# Patient Record
Sex: FEMALE | URBAN - METROPOLITAN AREA
[De-identification: names, ages, dates, MRNs, and addresses within clinical notes are randomized per-mention and may not be internally consistent; named-entity substitution may affect disease eponyms.]

---

## 2019-04-02 ENCOUNTER — APPOINTMENT (RX ONLY)
Dept: URBAN - METROPOLITAN AREA CLINIC 302 | Facility: CLINIC | Age: 39
Setting detail: DERMATOLOGY
End: 2019-04-02

## 2019-04-02 DIAGNOSIS — L98.8 OTHER SPECIFIED DISORDERS OF THE SKIN AND SUBCUTANEOUS TISSUE: ICD-10-CM

## 2019-04-02 DIAGNOSIS — L81.4 OTHER MELANIN HYPERPIGMENTATION: ICD-10-CM

## 2019-04-02 DIAGNOSIS — I78.8 OTHER DISEASES OF CAPILLARIES: ICD-10-CM

## 2019-04-02 PROCEDURE — ? COUNSELING

## 2019-04-02 PROCEDURE — ? PRESCRIPTION

## 2019-04-02 PROCEDURE — 99213 OFFICE O/P EST LOW 20 MIN: CPT

## 2019-04-02 PROCEDURE — ? PULSED-DYE LASER

## 2019-04-02 RX ORDER — TRETINOIN 0.25 MG/G
CREAM TOPICAL
Qty: 1 | Refills: 2 | Status: ERX | COMMUNITY
Start: 2019-04-02

## 2019-04-02 RX ADMIN — TRETINOIN: 0.25 CREAM TOPICAL at 15:04

## 2019-04-02 ASSESSMENT — LOCATION DETAILED DESCRIPTION DERM
LOCATION DETAILED: LEFT NARIS
LOCATION DETAILED: RIGHT MEDIAL MALAR CHEEK
LOCATION DETAILED: RIGHT CENTRAL MALAR CHEEK
LOCATION DETAILED: RIGHT NARIS
LOCATION DETAILED: LEFT CENTRAL MALAR CHEEK

## 2019-04-02 ASSESSMENT — LOCATION ZONE DERM
LOCATION ZONE: FACE
LOCATION ZONE: NOSE

## 2019-04-02 ASSESSMENT — LOCATION SIMPLE DESCRIPTION DERM
LOCATION SIMPLE: RIGHT NOSE
LOCATION SIMPLE: LEFT NOSE
LOCATION SIMPLE: LEFT CHEEK
LOCATION SIMPLE: RIGHT CHEEK

## 2019-04-02 NOTE — PROCEDURE: COUNSELING
Detail Level: Simple
Patient Specific Counseling (Will Not Stick From Patient To Patient): Patient referred to [facility] for IPL laser treatment.
Detail Level: Generalized

## 2019-04-02 NOTE — HPI: RASH (ROSACEA)
How Severe Is Your Rosacea?: moderate
Is This A New Presentation, Or A Follow-Up?: Follow Up Rosacea
Additional History: Patient would also like to discuss VBeam laser treatment to her nose. Patient had it last done in April of 2018 with good results. Patient was not sure how often she could have the laser treatment done. Patient also wanted to discuss laser treatment options for the brown spots on her face.

## 2019-04-02 NOTE — PROCEDURE: PULSED-DYE LASER
Spot Size: 7 mm
Treated Area: small area
Cryogen Time (Ms): 30
Laser Type: Vbeam Perfecta 595nm
Pulse Duration: 10 ms
Spot Size: 10x3 mm
Post-Care Instructions: I reviewed with the patient in detail post-care instructions. Patient should stay away from the sun and wear sun protection until treated areas are fully healed.
Delay Time (Ms): 20
Location (Required For Details To Render In Note But Body Touch Will Also Count For First Location): nose
Fluence In J/Cm2 (Optional): 8
Post-Procedure Care: Post care reviewed with patient.
Pulse Count (Optional): 10
Fluence In J/Cm2 (Optional): 12
Price (Use Numbers Only, No Special Characters Or $): 50
Cryogen Time (Ms): 30
Detail Level: Zone
Immediate Endpoint: erythema
Consent: Written consent obtained, risks reviewed including but not limited to crusting, scabbing, blistering, scarring, darker or lighter pigmentary change, incidental hair removal, bruising, and need for additional treatments.

## 2021-01-19 ENCOUNTER — TELEPHONE (OUTPATIENT)
Dept: SCHEDULING | Facility: IMAGING CENTER | Age: 41
End: 2021-01-19

## 2021-01-19 ENCOUNTER — NURSE TRIAGE (OUTPATIENT)
Dept: HEALTH INFORMATION MANAGEMENT | Facility: OTHER | Age: 41
End: 2021-01-19

## 2021-01-19 NOTE — TELEPHONE ENCOUNTER
Regarding: office clearance or next course of action  ----- Message from Deanne Adame sent at 1/19/2021  1:21 PM PST -----  Patient would like to get established with Cathy Arevalo but is experiencing upper abdominal pain. Request clearance for visit with provider.

## 2021-01-19 NOTE — TELEPHONE ENCOUNTER
"RUQ pain for 2 weeks.  The pain has not gotten better or worse.   Denies fever or chest pain  States will feel short of breath when she takes a deep breath.    No significant past medical history.  Stop drinking alcohol since the pain started.  Drank 2-3 glasses of wine 5-6 times a week.  Advise to see PCP.    Reason for Disposition  • Constant abdominal pain lasting > 2 hours    Additional Information  • Negative: Passed out (i.e., fainted, collapsed and was not responding)  • Negative: Shock suspected (e.g., cold/pale/clammy skin, too weak to stand, low BP, rapid pulse)  • Negative: Visible sweat on face or sweat is dripping down  • Negative: Chest pain  • Negative: SEVERE abdominal pain (e.g., excruciating)  • Negative: Pain lasting > 10 minutes and over 50 years old  • Negative: Pain lasting > 10 minutes and over 40 years old and associated chest, arm, neck, upper back, or jaw pain  • Negative: Pain lasting > 10 minutes and over 35 years old and at least one cardiac risk factor  • Negative: Pain lasting > 10 minutes and history of heart disease (i.e., heart attack, bypass surgery, angina, angioplasty, CHF)  • Negative: Recent injury to the abdomen  • Negative: Vomiting red blood or black (coffee ground) material  • Negative: Bloody, black, or tarry bowel movements  • Negative: Pregnant > 24 weeks and hand or face swelling    Answer Assessment - Initial Assessment Questions  1. LOCATION: \"Where does it hurt?\"       RUQ  2. RADIATION: \"Does the pain shoot anywhere else?\" (e.g., chest, back)      No  3. ONSET: \"When did the pain begin?\" (e.g., minutes, hours or days ago)       2 week  4. SUDDEN: \"Gradual or sudden onset?\"      Gradual  5. PATTERN \"Does the pain come and go, or is it constant?\"     - If constant: \"Is it getting better, staying the same, or worsening?\"       (Note: Constant means the pain never goes away completely; most serious pain is constant and it progresses)      - If intermittent: \"How long " "does it last?\" \"Do you have pain now?\"      (Note: Intermittent means the pain goes away completely between bouts)      Constant  6. SEVERITY: \"How bad is the pain?\"  (e.g., Scale 1-10; mild, moderate, or severe)     - MILD (1-3): doesn't interfere with normal activities, abdomen soft and not tender to touch      - MODERATE (4-7): interferes with normal activities or awakens from sleep, tender to touch      - SEVERE (8-10): excruciating pain, doubled over, unable to do any normal activities        2/10  7. RECURRENT SYMPTOM: \"Have you ever had this type of abdominal pain before?\" If so, ask: \"When was the last time?\" and \"What happened that time?\"       No  8. AGGRAVATING FACTORS: \"Does anything seem to cause this pain?\" (e.g., foods, stress, alcohol)      No  9. CARDIAC SYMPTOMS: \"Do you have any of the following symptoms: chest pain, difficulty breathing, sweating, nausea?\"      Difficulty breathing when she takes a deep breath  10. OTHER SYMPTOMS: \"Do you have any other symptoms?\" (e.g., fever, vomiting, diarrhea)        No  11. PREGNANCY: \"Is there any chance you are pregnant?\" \"When was your last menstrual period?\"        No    Protocols used: ABDOMINAL PAIN - UPPER-A-OH    "

## 2021-02-09 ENCOUNTER — OFFICE VISIT (OUTPATIENT)
Dept: URGENT CARE | Facility: CLINIC | Age: 41
End: 2021-02-09
Payer: COMMERCIAL

## 2021-02-09 VITALS
WEIGHT: 152.4 LBS | BODY MASS INDEX: 22.57 KG/M2 | HEIGHT: 69 IN | TEMPERATURE: 98 F | OXYGEN SATURATION: 98 % | SYSTOLIC BLOOD PRESSURE: 102 MMHG | DIASTOLIC BLOOD PRESSURE: 70 MMHG | RESPIRATION RATE: 16 BRPM | HEART RATE: 77 BPM

## 2021-02-09 DIAGNOSIS — R10.11 RUQ PAIN: ICD-10-CM

## 2021-02-09 LAB
APPEARANCE UR: CLEAR
BILIRUB UR STRIP-MCNC: NEGATIVE MG/DL
COLOR UR AUTO: YELLOW
GLUCOSE UR STRIP.AUTO-MCNC: NEGATIVE MG/DL
INT CON NEG: NEGATIVE
INT CON POS: POSITIVE
KETONES UR STRIP.AUTO-MCNC: 40 MG/DL
LEUKOCYTE ESTERASE UR QL STRIP.AUTO: NEGATIVE
NITRITE UR QL STRIP.AUTO: NEGATIVE
PH UR STRIP.AUTO: 6 [PH] (ref 5–8)
POC URINE PREGNANCY TEST: NEGATIVE
PROT UR QL STRIP: NEGATIVE MG/DL
RBC UR QL AUTO: NEGATIVE
SP GR UR STRIP.AUTO: >=1.03
UROBILINOGEN UR STRIP-MCNC: 0.2 MG/DL

## 2021-02-09 PROCEDURE — 81025 URINE PREGNANCY TEST: CPT | Performed by: PHYSICIAN ASSISTANT

## 2021-02-09 PROCEDURE — 81002 URINALYSIS NONAUTO W/O SCOPE: CPT | Performed by: PHYSICIAN ASSISTANT

## 2021-02-09 PROCEDURE — 99203 OFFICE O/P NEW LOW 30 MIN: CPT | Performed by: PHYSICIAN ASSISTANT

## 2021-02-09 RX ORDER — OMEPRAZOLE 20 MG/1
20 CAPSULE, DELAYED RELEASE ORAL DAILY
Qty: 30 CAP | Refills: 0 | Status: SHIPPED | OUTPATIENT
Start: 2021-02-09 | End: 2021-03-22

## 2021-02-09 ASSESSMENT — ENCOUNTER SYMPTOMS
DIARRHEA: 0
MYALGIAS: 0
ABDOMINAL PAIN: 1
BLOOD IN STOOL: 0
HEADACHES: 0
WEIGHT LOSS: 0
WEAKNESS: 0
DIZZINESS: 0
COUGH: 0
SHORTNESS OF BREATH: 0
HEARTBURN: 0
CONSTIPATION: 0
VOMITING: 0
PALPITATIONS: 0
FLANK PAIN: 0
NAUSEA: 0
DIAPHORESIS: 0
FEVER: 0
CHILLS: 0

## 2021-02-10 ENCOUNTER — HOSPITAL ENCOUNTER (OUTPATIENT)
Dept: LAB | Facility: MEDICAL CENTER | Age: 41
End: 2021-02-10
Attending: PHYSICIAN ASSISTANT
Payer: COMMERCIAL

## 2021-02-10 ENCOUNTER — TELEPHONE (OUTPATIENT)
Dept: OBGYN | Facility: CLINIC | Age: 41
End: 2021-02-10

## 2021-02-10 DIAGNOSIS — R10.11 RUQ PAIN: ICD-10-CM

## 2021-02-10 LAB
ALBUMIN SERPL BCP-MCNC: 4.2 G/DL (ref 3.2–4.9)
ALBUMIN/GLOB SERPL: 1.5 G/DL
ALP SERPL-CCNC: 44 U/L (ref 30–99)
ALT SERPL-CCNC: <5 U/L (ref 2–50)
ANION GAP SERPL CALC-SCNC: 9 MMOL/L (ref 7–16)
AST SERPL-CCNC: 19 U/L (ref 12–45)
BASOPHILS # BLD AUTO: 1.2 % (ref 0–1.8)
BASOPHILS # BLD: 0.05 K/UL (ref 0–0.12)
BILIRUB CONJ SERPL-MCNC: <0.2 MG/DL (ref 0.1–0.5)
BILIRUB INDIRECT SERPL-MCNC: NORMAL MG/DL (ref 0–1)
BILIRUB SERPL-MCNC: 0.4 MG/DL (ref 0.1–1.5)
BUN SERPL-MCNC: 7 MG/DL (ref 8–22)
CALCIUM SERPL-MCNC: 9 MG/DL (ref 8.5–10.5)
CHLORIDE SERPL-SCNC: 104 MMOL/L (ref 96–112)
CO2 SERPL-SCNC: 24 MMOL/L (ref 20–33)
CREAT SERPL-MCNC: 0.56 MG/DL (ref 0.5–1.4)
EOSINOPHIL # BLD AUTO: 0.07 K/UL (ref 0–0.51)
EOSINOPHIL NFR BLD: 1.7 % (ref 0–6.9)
ERYTHROCYTE [DISTWIDTH] IN BLOOD BY AUTOMATED COUNT: 39 FL (ref 35.9–50)
GLOBULIN SER CALC-MCNC: 2.8 G/DL (ref 1.9–3.5)
GLUCOSE SERPL-MCNC: 83 MG/DL (ref 65–99)
HCT VFR BLD AUTO: 41.2 % (ref 37–47)
HGB BLD-MCNC: 14 G/DL (ref 12–16)
IMM GRANULOCYTES # BLD AUTO: 0.02 K/UL (ref 0–0.11)
IMM GRANULOCYTES NFR BLD AUTO: 0.5 % (ref 0–0.9)
LYMPHOCYTES # BLD AUTO: 1.71 K/UL (ref 1–4.8)
LYMPHOCYTES NFR BLD: 40.6 % (ref 22–41)
MCH RBC QN AUTO: 30.5 PG (ref 27–33)
MCHC RBC AUTO-ENTMCNC: 34 G/DL (ref 33.6–35)
MCV RBC AUTO: 89.8 FL (ref 81.4–97.8)
MONOCYTES # BLD AUTO: 0.47 K/UL (ref 0–0.85)
MONOCYTES NFR BLD AUTO: 11.2 % (ref 0–13.4)
NEUTROPHILS # BLD AUTO: 1.89 K/UL (ref 2–7.15)
NEUTROPHILS NFR BLD: 44.8 % (ref 44–72)
NRBC # BLD AUTO: 0 K/UL
NRBC BLD-RTO: 0 /100 WBC
PLATELET # BLD AUTO: 266 K/UL (ref 164–446)
PMV BLD AUTO: 10.1 FL (ref 9–12.9)
POTASSIUM SERPL-SCNC: 4 MMOL/L (ref 3.6–5.5)
PROT SERPL-MCNC: 7 G/DL (ref 6–8.2)
RBC # BLD AUTO: 4.59 M/UL (ref 4.2–5.4)
SODIUM SERPL-SCNC: 137 MMOL/L (ref 135–145)
WBC # BLD AUTO: 4.2 K/UL (ref 4.8–10.8)

## 2021-02-10 PROCEDURE — 82248 BILIRUBIN DIRECT: CPT

## 2021-02-10 PROCEDURE — 85025 COMPLETE CBC W/AUTO DIFF WBC: CPT

## 2021-02-10 PROCEDURE — 36415 COLL VENOUS BLD VENIPUNCTURE: CPT

## 2021-02-10 PROCEDURE — 80053 COMPREHEN METABOLIC PANEL: CPT

## 2021-02-10 NOTE — PROGRESS NOTES
Subjective:   Hailey Machuca is a 41 y.o. female who presents for Abdominal Pain (x1 month)      HPI:  This is a very pleasant 41-year-old female presenting to the clinic with right upper quadrant abdominal pain x1 month.  Patient states the pain is a dull achy and bloated sensation beneath her right rib cage.  The pain has been constant for the last 1 month.  There seems to be no aggravating or alleviating factors.  Pain is not increased after eating.  She denies any associated nausea, vomiting or diarrhea.  Denies any fevers, chills or myalgias.  She denies any melena or hematemesis.  She has not tried any OTC medications for her symptoms.  Denies any previous history of cholelithiasis, GERD or PUD.  Prior to the onset of her abdominal pain she did drink approximately 3 to 4 glasses of wine 5 nights per week.  Since her abdominal pain she quit drinking.  She denies any drug use.  Denies any new medications.  Denies any history of liver disease.  Denies any significant family history.    Review of Systems   Constitutional: Negative for chills, diaphoresis, fever, malaise/fatigue and weight loss.   HENT: Negative for congestion.    Respiratory: Negative for cough and shortness of breath.    Cardiovascular: Negative for chest pain and palpitations.   Gastrointestinal: Positive for abdominal pain. Negative for blood in stool, constipation, diarrhea, heartburn, melena, nausea and vomiting.   Genitourinary: Negative for dysuria, flank pain, frequency, hematuria and urgency.   Musculoskeletal: Negative for myalgias.   Skin: Negative for rash.   Neurological: Negative for dizziness, weakness and headaches.       Medications:    • omeprazole    Allergies: Penicillins and Sulfa drugs    Problem List: Hailey Machuca does not have a problem list on file.    Surgical History:  No past surgical history on file.    Past Social Hx: Hailey Machuca  reports that she has never smoked. She has never used smokeless tobacco. She reports  "current alcohol use. She reports that she does not use drugs.     Past Family Hx:  Hailey Machuca family history is not on file.     Problem list, medications, and allergies reviewed by myself today in Epic.     Objective:     /70 (BP Location: Left arm, Patient Position: Sitting)   Pulse 77   Temp 36.7 °C (98 °F) (Tympanic)   Resp 16   Ht 1.753 m (5' 9\")   Wt 69.1 kg (152 lb 6.4 oz)   SpO2 98%   BMI 22.51 kg/m²     Physical Exam  Constitutional:       General: She is not in acute distress.     Appearance: Normal appearance. She is not ill-appearing, toxic-appearing or diaphoretic.   HENT:      Head: Normocephalic and atraumatic.      Mouth/Throat:      Mouth: Mucous membranes are moist.      Pharynx: No oropharyngeal exudate or posterior oropharyngeal erythema.   Eyes:      Conjunctiva/sclera: Conjunctivae normal.   Neck:      Musculoskeletal: Normal range of motion. No neck rigidity or muscular tenderness.   Cardiovascular:      Rate and Rhythm: Normal rate and regular rhythm.      Pulses: Normal pulses.      Heart sounds: Normal heart sounds.   Pulmonary:      Effort: Pulmonary effort is normal.      Breath sounds: Normal breath sounds. No wheezing, rhonchi or rales.   Abdominal:      General: Bowel sounds are normal. There is no distension.      Palpations: Abdomen is soft. There is no mass.      Tenderness: There is abdominal tenderness. There is no right CVA tenderness, left CVA tenderness, guarding or rebound.      Hernia: No hernia is present.      Comments: Mild right upper quadrant tenderness to deep palpation.  Negative Gamble sign.  Normal bowel sounds in all quadrant.  No guarding or rebound tenderness.  Negative McBurney's point.   Musculoskeletal: Normal range of motion.   Lymphadenopathy:      Cervical: No cervical adenopathy.   Skin:     General: Skin is warm and dry.      Capillary Refill: Capillary refill takes less than 2 seconds.   Neurological:      General: No focal deficit " present.      Mental Status: She is alert and oriented to person, place, and time. Mental status is at baseline.   Psychiatric:         Mood and Affect: Mood normal.         Thought Content: Thought content normal.       Urine analysis: 40 mg/dL ketones.  All others within normal limits.  Urine hCG: Negative    Assessment/Plan:     Diagnosis and associated orders:     1. RUQ pain  Comp Metabolic Panel    CBC WITH DIFFERENTIAL    HEPATIC FUNCTION PANEL    omeprazole (PRILOSEC) 20 MG delayed-release capsule    POCT Urinalysis    POCT Pregnancy      Comments/MDM:       • Differential diagnoses and treatment options were discussed with the patient at length today.  Current differentials include cholelithiasis versus hepatitis versus PUD versus pancreatitis versus gastritis.  On physical exam she was minimally tender to palpation.  Vital signs stable and within normal limits.  At this time she would rather have basic blood work performed.  Right upper quadrant imaging was discussed however she respectfully declined this at this time.  Vitals like the patient to start on omeprazole 20 mg daily for the next 14 days.  I will follow-up once her lab results are available.  Encouraged to call with any questions or concerns.  Strict ER precautions were given for any red flag symptoms in which we discussed in clinic.           Differential diagnosis, natural history, supportive care, and indications for immediate follow-up discussed.    Advised the patient to follow-up with the primary care physician for recheck, reevaluation, and consideration of further management.    Please note that this dictation was created using voice recognition software. I have made reasonable attempt to correct obvious errors, but I expect that there are errors of grammar and possibly content that I did not discover before finalizing the note.    This note was electronically signed by MOJGAN Paulino PA-C

## 2021-02-21 ENCOUNTER — OFFICE VISIT (OUTPATIENT)
Dept: URGENT CARE | Facility: CLINIC | Age: 41
End: 2021-02-21
Payer: COMMERCIAL

## 2021-02-21 VITALS
SYSTOLIC BLOOD PRESSURE: 106 MMHG | WEIGHT: 152 LBS | OXYGEN SATURATION: 97 % | DIASTOLIC BLOOD PRESSURE: 66 MMHG | BODY MASS INDEX: 22.51 KG/M2 | HEIGHT: 69 IN | TEMPERATURE: 99 F | RESPIRATION RATE: 16 BRPM | HEART RATE: 64 BPM

## 2021-02-21 DIAGNOSIS — R10.11 RUQ PAIN: ICD-10-CM

## 2021-02-21 PROCEDURE — 99213 OFFICE O/P EST LOW 20 MIN: CPT | Performed by: FAMILY MEDICINE

## 2021-02-21 RX ORDER — IBUPROFEN 200 MG
200 TABLET ORAL EVERY 6 HOURS PRN
COMMUNITY
End: 2022-03-24

## 2021-02-21 ASSESSMENT — ENCOUNTER SYMPTOMS
FEVER: 0
VOMITING: 0
COUGH: 0
SORE THROAT: 0
DIARRHEA: 0
ABDOMINAL PAIN: 1

## 2021-02-21 ASSESSMENT — FIBROSIS 4 INDEX: FIB4 SCORE: 1.313198307917873974

## 2021-02-21 NOTE — PROGRESS NOTES
"Subjective:     Hailey Machuca is a 41 y.o. female who presents for Abdominal Pain (last visit: 02/09/2021, close to 2 months,RUQ pain increasing and radiating to her back)    HPI  Pt presents for evaluation of abd pain   Pt with RUQ chronically and worse the past 2 months   Was seen in office 2 weeks ago   At the time, obtained labs and empirically treated with omeprazole  Does not feel like the omeprazole is helping  Feels like a cramp in the stomach   Pain is constantly present and stable  Bothers her a little bit more with eating  Patient a vegetarian  No family members have needed cholecystectomy as far she knows    Review of Systems   Constitutional: Negative for fever.   HENT: Negative for sore throat.    Respiratory: Negative for cough.    Gastrointestinal: Positive for abdominal pain. Negative for diarrhea and vomiting.   Skin: Negative for rash.     PMH:  has no past medical history on file.  MEDS:   Current Outpatient Medications:   •  ibuprofen (MOTRIN) 200 MG Tab, Take 200 mg by mouth every 6 hours as needed., Disp: , Rfl:   •  omeprazole (PRILOSEC) 20 MG delayed-release capsule, Take 1 Cap by mouth every day., Disp: 30 Cap, Rfl: 0  ALLERGIES:   Allergies   Allergen Reactions   • Penicillins Anaphylaxis   • Sulfa Drugs Anaphylaxis     SURGHX: History reviewed. No pertinent surgical history.  SOCHX:  reports that she has never smoked. She has never used smokeless tobacco. She reports previous alcohol use. She reports that she does not use drugs.     Objective:   /66 (BP Location: Right arm, Patient Position: Sitting, BP Cuff Size: Adult)   Pulse 64   Temp 37.2 °C (99 °F) (Temporal)   Resp 16   Ht 1.753 m (5' 9\")   Wt 68.9 kg (152 lb)   SpO2 97%   BMI 22.45 kg/m²     Physical Exam  Constitutional:       General: She is not in acute distress.     Appearance: She is well-developed. She is not diaphoretic.   HENT:      Head: Normocephalic and atraumatic.   Pulmonary:      Effort: Pulmonary " effort is normal.   Abdominal:      General: Abdomen is flat. Bowel sounds are normal. There is no distension.      Palpations: Abdomen is soft.      Tenderness: There is no guarding or rebound.      Comments: Neg Gamble's, +TTP in RUQ    Skin:     General: Skin is warm and dry.      Findings: No erythema.   Neurological:      Mental Status: She is alert and oriented to person, place, and time.   Psychiatric:         Behavior: Behavior normal.         Thought Content: Thought content normal.         Judgment: Judgment normal.         Assessment/Plan:   Assessment    1. RUQ pain  - US-RUQ; Future    Patient with right upper quadrant pain.  Not improving with PPI therapy.  Most concerned for cholelithiasis.  Will obtain right upper quadrant ultrasound to further evaluate.  Patient advised to follow a low-fat diet until then.  We will follow-up test results and consider referrals to general surgery versus GI depending on the results

## 2021-02-22 ENCOUNTER — HOSPITAL ENCOUNTER (OUTPATIENT)
Dept: RADIOLOGY | Facility: MEDICAL CENTER | Age: 41
End: 2021-02-22
Attending: FAMILY MEDICINE
Payer: COMMERCIAL

## 2021-02-22 DIAGNOSIS — R10.11 RUQ PAIN: ICD-10-CM

## 2021-02-22 PROCEDURE — 76705 ECHO EXAM OF ABDOMEN: CPT

## 2021-02-23 ENCOUNTER — TELEPHONE (OUTPATIENT)
Dept: URGENT CARE | Facility: PHYSICIAN GROUP | Age: 41
End: 2021-02-23

## 2021-02-23 DIAGNOSIS — R10.11 RUQ PAIN: ICD-10-CM

## 2021-02-23 NOTE — TELEPHONE ENCOUNTER
Called to discuss ultrasound results.  Right upper quadrant ultrasound within normal limits.  Patient's symptoms still suspicious for possible gallbladder etiology as the source of her pain.  Will refer to GI for further evaluation and management.

## 2021-03-11 ENCOUNTER — GYNECOLOGY VISIT (OUTPATIENT)
Dept: OBGYN | Facility: CLINIC | Age: 41
End: 2021-03-11
Payer: COMMERCIAL

## 2021-03-11 VITALS
SYSTOLIC BLOOD PRESSURE: 102 MMHG | WEIGHT: 152 LBS | BODY MASS INDEX: 22.51 KG/M2 | HEIGHT: 69 IN | DIASTOLIC BLOOD PRESSURE: 71 MMHG

## 2021-03-11 DIAGNOSIS — Z01.419 WELL WOMAN EXAM WITH ROUTINE GYNECOLOGICAL EXAM: Primary | ICD-10-CM

## 2021-03-11 DIAGNOSIS — T83.32XA INTRAUTERINE CONTRACEPTIVE DEVICE THREADS LOST, INITIAL ENCOUNTER: ICD-10-CM

## 2021-03-11 PROCEDURE — 99385 PREV VISIT NEW AGE 18-39: CPT | Performed by: NURSE PRACTITIONER

## 2021-03-11 ASSESSMENT — ENCOUNTER SYMPTOMS
CONSTITUTIONAL NEGATIVE: 1
CARDIOVASCULAR NEGATIVE: 1
PSYCHIATRIC NEGATIVE: 1
GASTROINTESTINAL NEGATIVE: 1
EYES NEGATIVE: 1
MUSCULOSKELETAL NEGATIVE: 1
NEUROLOGICAL NEGATIVE: 1
RESPIRATORY NEGATIVE: 1

## 2021-03-11 ASSESSMENT — FIBROSIS 4 INDEX: FIB4 SCORE: 1.313198307917873974

## 2021-03-11 NOTE — PROGRESS NOTES
GYN visit for Annual exam/pap  LMP: none. Currently on MIrena IUD since 2015  WT: 152 lb  BP: 102/71  BC: Mirena IUD since 01/2015. Would like removal and re-insertion.  Last pap: 05/2019 Negative per pt, done in [place].  Pt states no complaints or concerns today  Good # [phone]

## 2021-03-11 NOTE — PROGRESS NOTES
Hailey Machuca is a 41 y.o. y.o. female who presents for her Gynecologic Exam        HPI Comments: Pt presents for well woman exam. Pt has no  complaints at this time.  Has mirena IUD in place since 2015 and would like replaced.She does mention her previous provider in [place] did not see strings during her last visit last year, however she had an US and the placement was correct.      No LMP recorded. First menses age 15, 30 day cycle, lasts 7 days, light to moderate.  No menses since IUD placed.  She has a normal pap in 2019 and it was normal per pt report.     She does not desire pregnancy and would like her Mirena removed and a new one placed.    .  Review of Systems   Pertinent positives documented in HPI and all other systems reviewed & are negative  Review of Systems   Constitutional: Negative.    HENT: Negative.    Eyes: Negative.    Respiratory: Negative.    Cardiovascular: Negative.    Gastrointestinal: Negative.    Genitourinary: Negative.    Musculoskeletal: Negative.    Skin: Negative.    Neurological: Negative.    Endo/Heme/Allergies: Negative.    Psychiatric/Behavioral: Negative.    All other systems reviewed and are negative.      All PMH, PSH, allergies, social history and FH reviewed and updated today:  No past medical history on file.  No past surgical history on file.  Penicillins and Sulfa drugs  Social History     Socioeconomic History   • Marital status:      Spouse name: Not on file   • Number of children: Not on file   • Years of education: Not on file   • Highest education level: Not on file   Occupational History   • Not on file   Tobacco Use   • Smoking status: Never Smoker   • Smokeless tobacco: Never Used   Substance and Sexual Activity   • Alcohol use: Not Currently     Comment: occ   • Drug use: Never   • Sexual activity: Not on file   Other Topics Concern   • Not on file   Social History Narrative   • Not on file     Social Determinants of Health     Financial Resource  Strain:    • Difficulty of Paying Living Expenses:    Food Insecurity:    • Worried About Running Out of Food in the Last Year:    • Ran Out of Food in the Last Year:    Transportation Needs:    • Lack of Transportation (Medical):    • Lack of Transportation (Non-Medical):    Physical Activity:    • Days of Exercise per Week:    • Minutes of Exercise per Session:    Stress:    • Feeling of Stress :    Social Connections:    • Frequency of Communication with Friends and Family:    • Frequency of Social Gatherings with Friends and Family:    • Attends Jewish Services:    • Active Member of Clubs or Organizations:    • Attends Club or Organization Meetings:    • Marital Status:    Intimate Partner Violence:    • Fear of Current or Ex-Partner:    • Emotionally Abused:    • Physically Abused:    • Sexually Abused:      No family history on file.  Medications:   Current Outpatient Medications Ordered in Epic   Medication Sig Dispense Refill   • ibuprofen (MOTRIN) 200 MG Tab Take 200 mg by mouth every 6 hours as needed.     • omeprazole (PRILOSEC) 20 MG delayed-release capsule Take 1 Cap by mouth every day. 30 Cap 0     No current Epic-ordered facility-administered medications on file.          Objective:   Vital measurements:  There were no vitals taken for this visit.  There is no height or weight on file to calculate BMI. (Goal BM I>18 <25)    Physical Exam   Nursing note and vitals reviewed.  Constitutional: She is oriented to person, place, and time. She appears well-developed and well-nourished. No distress.     HEENT:   Head: Normocephalic and atraumatic.   Right Ear: External ear normal.   Left Ear: External ear normal.   Nose: Nose normal.   Eyes: Conjunctivae and EOM are normal. Pupils are equal, round, and reactive to light. No scleral icterus.     Neck: Normal range of motion. Neck supple. No tracheal deviation present. No thyromegaly present.     Pulmonary/Chest: Effort normal and breath sounds normal. No  respiratory distress. She has no wheezes. She has no rales. She exhibits no tenderness.     Cardiovascular: Regular, rate and rhythm. No JVD.    Abdominal: Soft. Bowel sounds are normal. She exhibits no distension and no mass. No tenderness. She has no rebound and no guarding.     Breast:  Symmetrical, normal consistency without masses., No dimpling or skin changes, Normal nipples without discharge, no axillary lymphadenopathy    Genitourinary:  Pelvic exam was performed with patient supine.  External genitalia with no abnormal pigmentation, labial fusion,rash, tenderness, lesion or injury to the labia bilaterally.  Vagina is moist with no lesions, foul discharge, erythema, tenderness or bleeding. No foreign body around the vagina or signs of injury.   Cervix exhibits no motion tenderness, no discharge and no friability. No strings visualized from cervical os.   Uterus is midline not deviated, not enlarged, not fixed and not tender.  Right adnexum displays no mass, no tenderness and no fullness. Left adnexum displays no mass, no tenderness and no fullness.     Musculoskeletal: Normal range of motion. She exhibits no edema and no tenderness.     Lymphadenopathy: She has no cervical adenopathy.     Neurological: She is alert and oriented to person, place, and time. She exhibits normal muscle tone.     Skin: Skin is warm and dry. No rash noted. She is not diaphoretic. No erythema. No pallor.     Psychiatric: She has a normal mood and affect. Her behavior is normal. Judgment and thought content normal.          Assessment:   Well woman exam with gynecologic exam  Missing IUD strings  IUD  and desires replacement  Pap up to date      Plan:   physical exam performed.   Will make appt to have IUD removed and replaced.  Aware possibility of no success and may require surgical intervention if unable to extract in office.   Monthly SBE.  Counseling: breast self exam, mammography screening, family planning choices,  menopause, osteoporosis and adequate intake of calcium and vitamin D  Encourage exercise and proper diet.  Mammograms starting @ age 40 annually.  See medications and orders placed in encounter report.  HPV series complete  Has appt with primary care end of month and will get labs ordered there

## 2021-03-22 ENCOUNTER — OFFICE VISIT (OUTPATIENT)
Dept: MEDICAL GROUP | Facility: LAB | Age: 41
End: 2021-03-22
Payer: COMMERCIAL

## 2021-03-22 ENCOUNTER — HOSPITAL ENCOUNTER (OUTPATIENT)
Dept: RADIOLOGY | Facility: MEDICAL CENTER | Age: 41
End: 2021-03-22
Attending: PHYSICIAN ASSISTANT
Payer: COMMERCIAL

## 2021-03-22 ENCOUNTER — HOSPITAL ENCOUNTER (OUTPATIENT)
Dept: LAB | Facility: MEDICAL CENTER | Age: 41
End: 2021-03-22
Attending: PHYSICIAN ASSISTANT
Payer: COMMERCIAL

## 2021-03-22 VITALS
HEIGHT: 69 IN | BODY MASS INDEX: 22.51 KG/M2 | TEMPERATURE: 97.8 F | WEIGHT: 152 LBS | DIASTOLIC BLOOD PRESSURE: 74 MMHG | HEART RATE: 72 BPM | OXYGEN SATURATION: 97 % | SYSTOLIC BLOOD PRESSURE: 102 MMHG | RESPIRATION RATE: 14 BRPM

## 2021-03-22 DIAGNOSIS — Z00.00 PREVENTATIVE HEALTH CARE: ICD-10-CM

## 2021-03-22 DIAGNOSIS — R10.11 RUQ PAIN: ICD-10-CM

## 2021-03-22 DIAGNOSIS — R07.81 RIB PAIN ON RIGHT SIDE: Primary | ICD-10-CM

## 2021-03-22 DIAGNOSIS — R07.81 RIB PAIN ON RIGHT SIDE: ICD-10-CM

## 2021-03-22 LAB
CHOLEST SERPL-MCNC: 184 MG/DL (ref 100–199)
FASTING STATUS PATIENT QL REPORTED: NORMAL
HDLC SERPL-MCNC: 55 MG/DL
LDLC SERPL CALC-MCNC: 110 MG/DL
TRIGL SERPL-MCNC: 95 MG/DL (ref 0–149)
TSH SERPL DL<=0.005 MIU/L-ACNC: 0.98 UIU/ML (ref 0.38–5.33)

## 2021-03-22 PROCEDURE — 84443 ASSAY THYROID STIM HORMONE: CPT

## 2021-03-22 PROCEDURE — 80061 LIPID PANEL: CPT

## 2021-03-22 PROCEDURE — 99213 OFFICE O/P EST LOW 20 MIN: CPT | Performed by: PHYSICIAN ASSISTANT

## 2021-03-22 PROCEDURE — 71101 X-RAY EXAM UNILAT RIBS/CHEST: CPT | Mod: RT

## 2021-03-22 PROCEDURE — 36415 COLL VENOUS BLD VENIPUNCTURE: CPT

## 2021-03-22 ASSESSMENT — PATIENT HEALTH QUESTIONNAIRE - PHQ9: CLINICAL INTERPRETATION OF PHQ2 SCORE: 0

## 2021-03-22 ASSESSMENT — FIBROSIS 4 INDEX: FIB4 SCORE: 1.313198307917873974

## 2021-03-22 NOTE — ASSESSMENT & PLAN NOTE
New to me; has been seen by UC and GI for the RUQ pain.   Reports that this pain is dull, denies any sharp stabbing pains.   Competed trial of Prilosec without relief.   Has tried to cut out dairy and gluten without relief.   Has been using IBU occasionally.   Labs were WNL  Denies injury or trauma to the right sided abdominal region.   No nausea or vomiting.     US Abdomen:  IMPRESSION:  No acute sonographic abnormality. No gallstones.

## 2021-03-22 NOTE — PROGRESS NOTES
Subjective:   Hailey Machuca is a 41 y.o. female here today for establish care with new provider, ongoing right sided pain.     RUQ pain  New to me; has been seen by UC and GI for the RUQ pain.   Reports that this pain is dull, denies any sharp stabbing pains.   Competed trial of Prilosec without relief.   Has tried to cut out dairy and gluten without relief.   Has been using IBU occasionally.   Labs were WNL  Denies injury or trauma to the right sided abdominal region.   No nausea or vomiting.     US Abdomen:  IMPRESSION:  No acute sonographic abnormality. No gallstones.     *Reports that she did have an injury to her ribs several years ago.   No new injury or trauma, though has been carrying her 60+lb dog occasionally.     Current medicines (including changes today)  Current Outpatient Medications   Medication Sig Dispense Refill   • ibuprofen (MOTRIN) 200 MG Tab Take 200 mg by mouth every 6 hours as needed.       No current facility-administered medications for this visit.     She  has no past medical history of Addisons disease ([facility]), Adrenal disorder ([facility]), Allergy, Anemia, Anxiety, Arrhythmia, Arthritis, Asthma, Blood transfusion without reported diagnosis, Cancer ([facility]), Cataract, CHF (congestive heart failure) ([facility]), Clotting disorder ([facility]), COPD (chronic obstructive pulmonary disease) ([facility]), Cushings syndrome ([facility]), Depression, Diabetes ([facility]), Diabetic neuropathy ([facility]), GERD (gastroesophageal reflux disease), Glaucoma, Goiter, Head ache, Heart attack ([facility]), Heart murmur, HIV (human immunodeficiency virus infection) ([facility]), Hyperlipidemia, Hypertension, IBD (inflammatory bowel disease), Kidney disease, Meningitis, Migraine, Muscle disorder, Osteoporosis, Parathyroid disorder ([facility]), Pituitary disease ([facility]), Pulmonary emphysema ([facility]), Seizure ([facility]), Sickle cell disease ([facility]), Stroke ([facility]), Substance abuse ([facility]), Thyroid disease, Tuberculosis, or Urinary tract infection.    ROS   No chest pain, no shortness  "of breath, no abdominal pain       Objective:     /74 (BP Location: Left arm, Patient Position: Sitting, BP Cuff Size: Adult)   Pulse 72   Temp 36.6 °C (97.8 °F) (Temporal)   Resp 14   Ht 1.753 m (5' 9\")   Wt 68.9 kg (152 lb)   SpO2 97%  Body mass index is 22.45 kg/m².   Physical Exam:  Constitutional: Alert, no distress.  Skin: Warm, dry, good turgor, no rashes in visible areas.  Respiratory: Unlabored respiratory effort, lungs clear to auscultation, no wheezes, no ronchi.  Cardiovascular: Normal S1, S2, no murmur, no edema.  Abdomen: No rash or lesions. Soft, mild TTP in the RUQ, mild TTP along right sided ribs  no masses, no hepatosplenomegaly.  Psych: Alert and oriented x3, normal affect and mood.      Assessment and Plan:   The following treatment plan was discussed    1. RUQ pain  New to me; ongoing concern since 01/201  Will start with rib series.   Consider HIDA scan  Encouraged her to follow up with GI if pain continues.   - [id number] (WITH 1-VIEW CXR) RIGHT; Future    2. Rib pain on right side  See above.   - [id number] (WITH 1-VIEW CXR) RIGHT; Future    3. Preventative health care  New to me; has been seen by [facility]/UC w/in the past 3 years.   - Lipid Profile; Future  - TSH WITH REFLEX TO FT4; Future      Followup: Return if symptoms worsen or fail to improve.       Yahaira Amador P.A.-C.  Supervising MD: Dr. Emeterio Bustillos MD  03/22/21      "

## 2021-03-23 ENCOUNTER — PATIENT MESSAGE (OUTPATIENT)
Dept: MEDICAL GROUP | Facility: LAB | Age: 41
End: 2021-03-23

## 2021-03-23 DIAGNOSIS — R10.11 RIGHT UPPER QUADRANT PAIN: ICD-10-CM

## 2021-03-23 NOTE — PROGRESS NOTES
1. Right upper quadrant pain  NM-BILIARY (HIDA) SCAN WITH CCK     See OV note from 03/22/2021.   Right sided rib series was WNL.   Continues to have daily RUQ pain.   US non diagnostic   Encouraged patient to reach out to insurance to check on coverage on this study.     Yahaira Amador P.A.-C.

## 2021-03-23 NOTE — PATIENT COMMUNICATION
Called back Jose about Yahaira vasquez the ordering provider is ok with the fatty meal replacement if the CCK is not available.

## 2021-03-30 DIAGNOSIS — R10.11 RUQ PAIN: ICD-10-CM

## 2021-03-30 DIAGNOSIS — R19.06 EPIGASTRIC MASS: ICD-10-CM

## 2021-03-31 NOTE — PROGRESS NOTES
1. RUQ pain  CT-ABDOMEN WITH & W/O   2. Epigastric mass  CT-ABDOMEN WITH & W/O     Pt with ongoing RUQ pain, reports new epigastric mass that is tender.   Possible hiatal hernia.   Will complete CT scan for further evaluation.     To postpone HIDA scan at this time.

## 2021-04-01 ENCOUNTER — APPOINTMENT (OUTPATIENT)
Dept: RADIOLOGY | Facility: MEDICAL CENTER | Age: 41
End: 2021-04-01
Attending: PHYSICIAN ASSISTANT
Payer: COMMERCIAL

## 2021-04-08 ENCOUNTER — HOSPITAL ENCOUNTER (OUTPATIENT)
Dept: RADIOLOGY | Facility: MEDICAL CENTER | Age: 41
End: 2021-04-08
Attending: PHYSICIAN ASSISTANT
Payer: COMMERCIAL

## 2021-04-08 DIAGNOSIS — R10.11 RUQ PAIN: ICD-10-CM

## 2021-04-08 DIAGNOSIS — R19.06 EPIGASTRIC MASS: ICD-10-CM

## 2021-04-08 PROCEDURE — 74160 CT ABDOMEN W/CONTRAST: CPT

## 2021-04-08 PROCEDURE — 700117 HCHG RX CONTRAST REV CODE 255: Performed by: PHYSICIAN ASSISTANT

## 2021-04-08 RX ADMIN — IOHEXOL 100 ML: 350 INJECTION, SOLUTION INTRAVENOUS at 09:15

## 2021-04-12 ENCOUNTER — GYNECOLOGY VISIT (OUTPATIENT)
Dept: OBGYN | Facility: CLINIC | Age: 41
End: 2021-04-12
Payer: COMMERCIAL

## 2021-04-12 VITALS
SYSTOLIC BLOOD PRESSURE: 100 MMHG | HEIGHT: 69 IN | WEIGHT: 150 LBS | BODY MASS INDEX: 22.22 KG/M2 | DIASTOLIC BLOOD PRESSURE: 59 MMHG

## 2021-04-12 DIAGNOSIS — Z30.430 ENCOUNTER FOR IUD INSERTION: ICD-10-CM

## 2021-04-12 DIAGNOSIS — Z30.432 ENCOUNTER FOR IUD REMOVAL: ICD-10-CM

## 2021-04-12 DIAGNOSIS — Z32.02 PREGNANCY EXAMINATION OR TEST, NEGATIVE RESULT: ICD-10-CM

## 2021-04-12 DIAGNOSIS — Z30.433 ENCOUNTER FOR IUD REMOVAL AND REINSERTION: Primary | ICD-10-CM

## 2021-04-12 LAB
INT CON NEG: NEGATIVE
INT CON POS: POSITIVE
POC URINE PREGNANCY TEST: NEGATIVE

## 2021-04-12 PROCEDURE — 81025 URINE PREGNANCY TEST: CPT | Performed by: NURSE PRACTITIONER

## 2021-04-12 ASSESSMENT — FIBROSIS 4 INDEX: FIB4 SCORE: 1.313198307917873974

## 2021-04-12 NOTE — PROGRESS NOTES
GYN visit for Mirena IUD removal & re-insertion. Consent signed.   LMP:2015  WT: 150 lb  BP: 100/59  Negative UPT today, done in clinic  Good # [phone]

## 2021-04-13 PROCEDURE — 58301 REMOVE INTRAUTERINE DEVICE: CPT | Performed by: NURSE PRACTITIONER

## 2021-04-13 PROCEDURE — 58300 INSERT INTRAUTERINE DEVICE: CPT | Performed by: NURSE PRACTITIONER

## 2021-04-13 NOTE — PROCEDURES
IUD Insertion    Date/Time: 4/13/2021 4:59 PM  Performed by: KONRAD Rojas  Authorized by: KONRAD Rojas     Consent:     Consent obtained:  Written    Consent given by:  Patient    Procedure risks and benefits discussed: yes      Patient questions answered: yes      Patient agrees, verbalizes understanding, and wants to proceed: yes      Educational handouts given: yes      Instructions and paperwork completed: yes    Pre-procedure details:     Negative GC/chlamydia test: no      Negative urine pregnancy test: yes      Negative serum pregnancy test: no    Procedure:     Pelvic exam performed: no      Sterile speculum placed in vagina: yes      Cervix visualized: yes      Cervix cleaned and prepped in sterile fashion: yes      Tenaculum applied to cervix: yes      Dilation needed: yes      Uterus sounded: yes      Uterus sound depth (cm):  6.5    IUD inserted with no complications: yes      IUD type:  Mirena    Strings trimmed: yes    Post-procedure:     Patient tolerated procedure well: yes      Patient will follow up after next period: yes

## 2021-04-13 NOTE — PROCEDURES
IUD Removal    Date/Time: 2021 4:58 PM  Performed by: KONRAD Rojas  Authorized by: KONRAD Rojas     Consent:     Consent obtained:  Written    Consent given by:  Patient    Procedure risks and benefits discussed: yes      Patient questions answered: yes      Patient agrees, verbalizes understanding, and wants to proceed: yes      Educational handouts given: no      Instructions and paperwork completed: yes    Pre-procedure details:     Reason for removal:  IUD      IUD placed at this facility: no      Length of time IUD in place:  6 years  Procedure:     Pelvic exam performed: no      Speculum placed: yes      IUD strings visualized in external os: no      Removal mechanism:  IUD hook    IUD removed intact: yes      IUD type removed:  Mirena    Removal complications: no    Post-procedure:     New birth control prescribed: yes      Counseling regarding contraception given: yes

## 2021-04-14 ENCOUNTER — APPOINTMENT (OUTPATIENT)
Dept: RADIOLOGY | Facility: MEDICAL CENTER | Age: 41
End: 2021-04-14
Attending: PHYSICIAN ASSISTANT
Payer: COMMERCIAL

## 2021-05-06 ENCOUNTER — HOSPITAL ENCOUNTER (OUTPATIENT)
Dept: RADIOLOGY | Facility: MEDICAL CENTER | Age: 41
End: 2021-05-06
Attending: PHYSICIAN ASSISTANT
Payer: COMMERCIAL

## 2021-05-06 DIAGNOSIS — R10.11 RIGHT UPPER QUADRANT PAIN: ICD-10-CM

## 2021-05-06 PROCEDURE — 78226 HEPATOBILIARY SYSTEM IMAGING: CPT

## 2021-05-24 ENCOUNTER — GYNECOLOGY VISIT (OUTPATIENT)
Dept: OBGYN | Facility: CLINIC | Age: 41
End: 2021-05-24
Payer: COMMERCIAL

## 2021-05-24 VITALS
HEIGHT: 69 IN | WEIGHT: 155 LBS | BODY MASS INDEX: 22.96 KG/M2 | DIASTOLIC BLOOD PRESSURE: 66 MMHG | SYSTOLIC BLOOD PRESSURE: 105 MMHG

## 2021-05-24 DIAGNOSIS — Z30.431 IUD CHECK UP: Primary | ICD-10-CM

## 2021-05-24 PROCEDURE — 99212 OFFICE O/P EST SF 10 MIN: CPT | Performed by: NURSE PRACTITIONER

## 2021-05-24 ASSESSMENT — FIBROSIS 4 INDEX: FIB4 SCORE: 1.313198307917873974

## 2021-05-24 NOTE — PROGRESS NOTES
S: Hailey Machuca is a 41 y.o. y.o. female who presents for contraceptive surveillance.    Pt had Mirena IUD placed on 4/12/2021 without any complications and presents for an IUD check up. She reports no bleeding, no pain, no discomfort with intercourse, no discharge. Denies fever, chills, nausea, vomiting. Overall very happy with device.    O:  FEMALE GYN: normal female external genitalia without lesions, clear and white vaginal discharge noted, vulva pink without erythema or friability, urethra is normal without discharge or scarring, no bladder fullness or masses, normal vagina and normal vaginal tone, normal cervix, normal  uterus, size and consistency, 2 black IUD strings seen at cervical os, 3  cm in length, normal anus and perineum.    A/P:  -Discussed coordination of care of IUD and normal menstrual irregularity side effects. -Reminded patient to check for strings monthly and to call the office if IUD has fallen out or any other problems should arise.  -Reminded patient IUD expires in 5 years.  -Reminded of annual gyn visit in one year.

## 2021-05-24 NOTE — PROGRESS NOTES
GYN visit for IUD check  LMP: unknown  WT: 155 lb  BP: 105/66  Pt states no complaints or concerns today  Good # [phone]

## 2022-03-23 SDOH — ECONOMIC STABILITY: HOUSING INSECURITY
IN THE LAST 12 MONTHS, WAS THERE A TIME WHEN YOU DID NOT HAVE A STEADY PLACE TO SLEEP OR SLEPT IN A SHELTER (INCLUDING NOW)?: NO

## 2022-03-23 SDOH — ECONOMIC STABILITY: INCOME INSECURITY: IN THE LAST 12 MONTHS, WAS THERE A TIME WHEN YOU WERE NOT ABLE TO PAY THE MORTGAGE OR RENT ON TIME?: NO

## 2022-03-23 SDOH — ECONOMIC STABILITY: TRANSPORTATION INSECURITY
IN THE PAST 12 MONTHS, HAS THE LACK OF TRANSPORTATION KEPT YOU FROM MEDICAL APPOINTMENTS OR FROM GETTING MEDICATIONS?: NO

## 2022-03-23 SDOH — HEALTH STABILITY: PHYSICAL HEALTH: ON AVERAGE, HOW MANY DAYS PER WEEK DO YOU ENGAGE IN MODERATE TO STRENUOUS EXERCISE (LIKE A BRISK WALK)?: 3 DAYS

## 2022-03-23 SDOH — ECONOMIC STABILITY: FOOD INSECURITY: WITHIN THE PAST 12 MONTHS, YOU WORRIED THAT YOUR FOOD WOULD RUN OUT BEFORE YOU GOT MONEY TO BUY MORE.: NEVER TRUE

## 2022-03-23 SDOH — HEALTH STABILITY: PHYSICAL HEALTH: ON AVERAGE, HOW MANY MINUTES DO YOU ENGAGE IN EXERCISE AT THIS LEVEL?: 40 MIN

## 2022-03-23 SDOH — ECONOMIC STABILITY: TRANSPORTATION INSECURITY
IN THE PAST 12 MONTHS, HAS LACK OF TRANSPORTATION KEPT YOU FROM MEETINGS, WORK, OR FROM GETTING THINGS NEEDED FOR DAILY LIVING?: NO

## 2022-03-23 SDOH — ECONOMIC STABILITY: INCOME INSECURITY: HOW HARD IS IT FOR YOU TO PAY FOR THE VERY BASICS LIKE FOOD, HOUSING, MEDICAL CARE, AND HEATING?: NOT HARD AT ALL

## 2022-03-23 SDOH — ECONOMIC STABILITY: HOUSING INSECURITY: IN THE LAST 12 MONTHS, HOW MANY PLACES HAVE YOU LIVED?: 2

## 2022-03-23 SDOH — ECONOMIC STABILITY: FOOD INSECURITY: WITHIN THE PAST 12 MONTHS, THE FOOD YOU BOUGHT JUST DIDN'T LAST AND YOU DIDN'T HAVE MONEY TO GET MORE.: NEVER TRUE

## 2022-03-23 SDOH — ECONOMIC STABILITY: TRANSPORTATION INSECURITY
IN THE PAST 12 MONTHS, HAS LACK OF RELIABLE TRANSPORTATION KEPT YOU FROM MEDICAL APPOINTMENTS, MEETINGS, WORK OR FROM GETTING THINGS NEEDED FOR DAILY LIVING?: NO

## 2022-03-23 SDOH — HEALTH STABILITY: MENTAL HEALTH
STRESS IS WHEN SOMEONE FEELS TENSE, NERVOUS, ANXIOUS, OR CAN'T SLEEP AT NIGHT BECAUSE THEIR MIND IS TROUBLED. HOW STRESSED ARE YOU?: TO SOME EXTENT

## 2022-03-23 ASSESSMENT — SOCIAL DETERMINANTS OF HEALTH (SDOH)
DO YOU BELONG TO ANY CLUBS OR ORGANIZATIONS SUCH AS CHURCH GROUPS UNIONS, FRATERNAL OR ATHLETIC GROUPS, OR SCHOOL GROUPS?: NO
HOW OFTEN DO YOU ATTEND CHURCH OR RELIGIOUS SERVICES?: 1 TO 4 TIMES PER YEAR
HOW OFTEN DO YOU ATTEND CHURCH OR RELIGIOUS SERVICES?: 1 TO 4 TIMES PER YEAR
DO YOU BELONG TO ANY CLUBS OR ORGANIZATIONS SUCH AS CHURCH GROUPS UNIONS, FRATERNAL OR ATHLETIC GROUPS, OR SCHOOL GROUPS?: NO
WITHIN THE PAST 12 MONTHS, YOU WORRIED THAT YOUR FOOD WOULD RUN OUT BEFORE YOU GOT THE MONEY TO BUY MORE: NEVER TRUE
HOW OFTEN DO YOU ATTENT MEETINGS OF THE CLUB OR ORGANIZATION YOU BELONG TO?: NEVER
HOW OFTEN DO YOU HAVE SIX OR MORE DRINKS ON ONE OCCASION: LESS THAN MONTHLY
HOW OFTEN DO YOU GET TOGETHER WITH FRIENDS OR RELATIVES?: ONCE A WEEK
HOW OFTEN DO YOU HAVE A DRINK CONTAINING ALCOHOL: 2-3 TIMES A WEEK
HOW MANY DRINKS CONTAINING ALCOHOL DO YOU HAVE ON A TYPICAL DAY WHEN YOU ARE DRINKING: 3 OR 4
HOW HARD IS IT FOR YOU TO PAY FOR THE VERY BASICS LIKE FOOD, HOUSING, MEDICAL CARE, AND HEATING?: NOT HARD AT ALL
IN A TYPICAL WEEK, HOW MANY TIMES DO YOU TALK ON THE PHONE WITH FAMILY, FRIENDS, OR NEIGHBORS?: ONCE A WEEK
IN A TYPICAL WEEK, HOW MANY TIMES DO YOU TALK ON THE PHONE WITH FAMILY, FRIENDS, OR NEIGHBORS?: ONCE A WEEK
HOW OFTEN DO YOU ATTENT MEETINGS OF THE CLUB OR ORGANIZATION YOU BELONG TO?: NEVER
HOW OFTEN DO YOU GET TOGETHER WITH FRIENDS OR RELATIVES?: ONCE A WEEK

## 2022-03-23 ASSESSMENT — LIFESTYLE VARIABLES
HOW MANY STANDARD DRINKS CONTAINING ALCOHOL DO YOU HAVE ON A TYPICAL DAY: 3 OR 4
HOW OFTEN DO YOU HAVE SIX OR MORE DRINKS ON ONE OCCASION: LESS THAN MONTHLY
HOW OFTEN DO YOU HAVE A DRINK CONTAINING ALCOHOL: 2-3 TIMES A WEEK

## 2022-03-24 ENCOUNTER — HOSPITAL ENCOUNTER (OUTPATIENT)
Dept: LAB | Facility: MEDICAL CENTER | Age: 42
End: 2022-03-24
Attending: PHYSICIAN ASSISTANT
Payer: COMMERCIAL

## 2022-03-24 ENCOUNTER — OFFICE VISIT (OUTPATIENT)
Dept: MEDICAL GROUP | Facility: LAB | Age: 42
End: 2022-03-24
Payer: COMMERCIAL

## 2022-03-24 ENCOUNTER — HOSPITAL ENCOUNTER (OUTPATIENT)
Facility: MEDICAL CENTER | Age: 42
End: 2022-03-24
Attending: PHYSICIAN ASSISTANT
Payer: COMMERCIAL

## 2022-03-24 VITALS
DIASTOLIC BLOOD PRESSURE: 76 MMHG | OXYGEN SATURATION: 96 % | RESPIRATION RATE: 16 BRPM | HEIGHT: 69 IN | SYSTOLIC BLOOD PRESSURE: 104 MMHG | TEMPERATURE: 98 F | HEART RATE: 79 BPM | BODY MASS INDEX: 22.78 KG/M2 | WEIGHT: 153.8 LBS

## 2022-03-24 DIAGNOSIS — Z12.4 SCREENING FOR CERVICAL CANCER: ICD-10-CM

## 2022-03-24 DIAGNOSIS — Z12.31 ENCOUNTER FOR SCREENING MAMMOGRAM FOR BREAST CANCER: ICD-10-CM

## 2022-03-24 DIAGNOSIS — R21 RASH AND NONSPECIFIC SKIN ERUPTION: ICD-10-CM

## 2022-03-24 DIAGNOSIS — Z00.00 PREVENTATIVE HEALTH CARE: Primary | ICD-10-CM

## 2022-03-24 DIAGNOSIS — Z00.00 PREVENTATIVE HEALTH CARE: ICD-10-CM

## 2022-03-24 LAB
ALBUMIN SERPL BCP-MCNC: 4.7 G/DL (ref 3.2–4.9)
ALBUMIN/GLOB SERPL: 1.9 G/DL
ALP SERPL-CCNC: 51 U/L (ref 30–99)
ALT SERPL-CCNC: 12 U/L (ref 2–50)
ANION GAP SERPL CALC-SCNC: 11 MMOL/L (ref 7–16)
AST SERPL-CCNC: 21 U/L (ref 12–45)
BASOPHILS # BLD AUTO: 1.2 % (ref 0–1.8)
BASOPHILS # BLD: 0.05 K/UL (ref 0–0.12)
BILIRUB SERPL-MCNC: 0.6 MG/DL (ref 0.1–1.5)
BUN SERPL-MCNC: 7 MG/DL (ref 8–22)
CALCIUM SERPL-MCNC: 9.4 MG/DL (ref 8.5–10.5)
CHLORIDE SERPL-SCNC: 104 MMOL/L (ref 96–112)
CHOLEST SERPL-MCNC: 210 MG/DL (ref 100–199)
CO2 SERPL-SCNC: 24 MMOL/L (ref 20–33)
CREAT SERPL-MCNC: 0.68 MG/DL (ref 0.5–1.4)
EOSINOPHIL # BLD AUTO: 0.08 K/UL (ref 0–0.51)
EOSINOPHIL NFR BLD: 1.9 % (ref 0–6.9)
ERYTHROCYTE [DISTWIDTH] IN BLOOD BY AUTOMATED COUNT: 40.3 FL (ref 35.9–50)
EST. AVERAGE GLUCOSE BLD GHB EST-MCNC: 94 MG/DL
FASTING STATUS PATIENT QL REPORTED: NORMAL
GFR SERPLBLD CREATININE-BSD FMLA CKD-EPI: 113 ML/MIN/1.73 M 2
GLOBULIN SER CALC-MCNC: 2.5 G/DL (ref 1.9–3.5)
GLUCOSE SERPL-MCNC: 72 MG/DL (ref 65–99)
HBA1C MFR BLD: 4.9 % (ref 4–5.6)
HCT VFR BLD AUTO: 41.9 % (ref 37–47)
HDLC SERPL-MCNC: 68 MG/DL
HGB BLD-MCNC: 14 G/DL (ref 12–16)
IMM GRANULOCYTES # BLD AUTO: 0.02 K/UL (ref 0–0.11)
IMM GRANULOCYTES NFR BLD AUTO: 0.5 % (ref 0–0.9)
LDLC SERPL CALC-MCNC: 127 MG/DL
LYMPHOCYTES # BLD AUTO: 1.8 K/UL (ref 1–4.8)
LYMPHOCYTES NFR BLD: 43.1 % (ref 22–41)
MCH RBC QN AUTO: 30.6 PG (ref 27–33)
MCHC RBC AUTO-ENTMCNC: 33.4 G/DL (ref 33.6–35)
MCV RBC AUTO: 91.7 FL (ref 81.4–97.8)
MONOCYTES # BLD AUTO: 0.42 K/UL (ref 0–0.85)
MONOCYTES NFR BLD AUTO: 10 % (ref 0–13.4)
NEUTROPHILS # BLD AUTO: 1.81 K/UL (ref 2–7.15)
NEUTROPHILS NFR BLD: 43.3 % (ref 44–72)
NRBC # BLD AUTO: 0 K/UL
NRBC BLD-RTO: 0 /100 WBC
PLATELET # BLD AUTO: 289 K/UL (ref 164–446)
PMV BLD AUTO: 9.5 FL (ref 9–12.9)
POTASSIUM SERPL-SCNC: 4.4 MMOL/L (ref 3.6–5.5)
PROT SERPL-MCNC: 7.2 G/DL (ref 6–8.2)
RBC # BLD AUTO: 4.57 M/UL (ref 4.2–5.4)
SODIUM SERPL-SCNC: 139 MMOL/L (ref 135–145)
TRIGL SERPL-MCNC: 73 MG/DL (ref 0–149)
TSH SERPL DL<=0.005 MIU/L-ACNC: 1.4 UIU/ML (ref 0.38–5.33)
WBC # BLD AUTO: 4.2 K/UL (ref 4.8–10.8)

## 2022-03-24 PROCEDURE — 80061 LIPID PANEL: CPT

## 2022-03-24 PROCEDURE — 84443 ASSAY THYROID STIM HORMONE: CPT

## 2022-03-24 PROCEDURE — 88175 CYTOPATH C/V AUTO FLUID REDO: CPT

## 2022-03-24 PROCEDURE — 83036 HEMOGLOBIN GLYCOSYLATED A1C: CPT

## 2022-03-24 PROCEDURE — 99396 PREV VISIT EST AGE 40-64: CPT | Performed by: PHYSICIAN ASSISTANT

## 2022-03-24 PROCEDURE — 80053 COMPREHEN METABOLIC PANEL: CPT

## 2022-03-24 PROCEDURE — 85025 COMPLETE CBC W/AUTO DIFF WBC: CPT

## 2022-03-24 PROCEDURE — 36415 COLL VENOUS BLD VENIPUNCTURE: CPT

## 2022-03-24 PROCEDURE — 87624 HPV HI-RISK TYP POOLED RSLT: CPT

## 2022-03-24 ASSESSMENT — FIBROSIS 4 INDEX: FIB4 SCORE: 1.35

## 2022-03-24 ASSESSMENT — PATIENT HEALTH QUESTIONNAIRE - PHQ9: CLINICAL INTERPRETATION OF PHQ2 SCORE: 0

## 2022-03-24 NOTE — PROGRESS NOTES
Hailey Machuca is a 42 y.o. female patient here for annual physical and Pap smear  HPI:    Preventative health care  Patient presents today for annual physical.  Overall doing well.  Recently turned 42 in, and is now due for mammogram.  Due for Pap smear which is to be completed today.  Colonoscopy to be completed at age 47.    Does report a rash in bilateral armpits.  Reports that she had a wax, and then went into a hot tub later that evening.  Has not used any cream on this area.    Current medicines (including changes today)  Current Outpatient Medications   Medication Sig Dispense Refill   • mupirocin (BACTROBAN) 2 % Ointment Apply 1 Application topically 2 times a day. 22 g 0     No current facility-administered medications for this visit.     She  has no past medical history of Addisons disease ([facility]), Adrenal disorder ([facility]), Allergy, Anemia, Anxiety, Arrhythmia, Arthritis, Asthma, Blood transfusion without reported diagnosis, Cancer ([facility]), Cataract, CHF (congestive heart failure) ([facility]), Clotting disorder ([facility]), COPD (chronic obstructive pulmonary disease) ([facility]), Cushings syndrome ([facility]), Depression, Diabetes ([facility]), Diabetic neuropathy ([facility]), GERD (gastroesophageal reflux disease), Glaucoma, Goiter, Head ache, Heart attack ([facility]), Heart murmur, HIV (human immunodeficiency virus infection) ([facility]), Hyperlipidemia, Hypertension, IBD (inflammatory bowel disease), Kidney disease, Meningitis, Migraine, Muscle disorder, Osteoporosis, Parathyroid disorder ([facility]), Pituitary disease ([facility]), Pulmonary emphysema ([facility]), Seizure ([facility]), Sickle cell disease ([facility]), Stroke ([facility]), Substance abuse ([facility]), Thyroid disease, Tuberculosis, or Urinary tract infection.  She  has a past surgical history that includes tonsillectomy and dental extraction(s).  Social History     Tobacco Use   • Smoking status: Never Smoker   • Smokeless tobacco: Never Used   Vaping Use   • Vaping Use: Never used   Substance Use Topics   • Alcohol use: Yes     " Comment: occasional    • Drug use: Never     Social History     Social History Narrative   • Not on file     History reviewed. No pertinent family history.  No family status information on file.         ROS  Constitutional: Negative for fever, chills and malaise/fatigue.   HENT: Negative for congestion.    Eyes: Negative for pain.   Respiratory: Negative for cough and shortness of breath.    Cardiovascular: Negative for leg swelling.   Gastrointestinal: Negative for nausea, vomiting, positive for intermittent abdominal pain.  No diarrhea.   Genitourinary: Negative for dysuria and hematuria.   Skin: Negative for rash.   Neurological: Negative for dizziness, focal weakness and headaches.   Endo/Heme/Allergies: Does not bruise/bleed easily.   Psychiatric/Behavioral: Negative for depression.  The patient is not nervous/anxious.    All other systems reviewed and are negative     Objective:     /76 (BP Location: Left arm, Patient Position: Sitting, BP Cuff Size: Adult)   Pulse 79   Temp 36.7 °C (98 °F) (Temporal)   Resp 16   Ht 1.753 m (5' 9\")   Wt 69.8 kg (153 lb 12.8 oz)   SpO2 96%  Body mass index is 22.71 kg/m².  Physical Exam:    Constitutional: Alert, no distress.  Skin: Warm, dry, good turgor, small area of erythema noted on superiormost portion of bilateral axilla; R>L  Eye: Equal, round and reactive, conjunctiva clear, lids normal.  Neck: Trachea midline, no masses, no thyromegaly. No cervical or supraclavicular lymphadenopathy.  Respiratory: Unlabored respiratory effort, lungs clear to auscultation, no wheezes, no ronchi.  Cardiovascular: Normal S1, S2, no murmur, no edema.  Abdomen: Soft, non-tender, no masses, no hepatosplenomegaly.  BREAST: No changes of skin or nipples, b/l. No lumps, nodules or masses, b/l. No TTP of b/l breasts. No axillary lymphadenopathy, b/l.  : External genitalia is normal without lesions. Vaginal canal is without lesions, blood or discharge. Cervix appears normal. No " CMT  Psych: Alert and oriented x3, normal affect and mood.      Assessment and Plan:   The following treatment plan was discussed    1. Preventative health care  Doing well, Pap completed today.  Order has been placed for mammogram.  Continue well-balanced diet and exercise.  Follow-up in 1 year, of course sooner as needed  - Comp Metabolic Panel; Future  - CBC WITH DIFFERENTIAL; Future  - Lipid Profile; Future  - HEMOGLOBIN A1C; Future  - TSH WITH REFLEX TO FT4; Future    2. Encounter for screening mammogram for breast cancer  - MA-SCREENING MAMMO BILAT W/TOMOSYNTHESIS W/CAD; Future    3. Screening for cervical cancer  - THINPREP PAP WITH HPV; Future    4. Rash and nonspecific skin eruption  Okay to use an over-the-counter hydrocortisone 1% cream twice daily for 5 to 7 days.  Can also add mupirocin as written given her subjective history of going into a hot tub shortly after having waxed her armpits.  Continue to monitor, follow-up in 1 week if no improvement  - mupirocin (BACTROBAN) 2 % Ointment; Apply 1 Application topically 2 times a day.  Dispense: 22 g; Refill: 0      Followup: Return in about 1 year (around 3/24/2023), or if symptoms worsen or fail to improve.       Yhaaira Amador PLuisA.-C.  Supervising MD: Dr. Porfirio Bustillos MD  03/24/22

## 2022-03-24 NOTE — ASSESSMENT & PLAN NOTE
Patient presents today for annual physical.  Overall doing well.  Recently turned 42 in, and is now due for mammogram.  Due for Pap smear which is to be completed today.  Colonoscopy to be completed at age 47.    Does report a rash in bilateral armpits.  Reports that she had a wax, and then went into a hot tub later that evening.  Has not used any cream on this area.

## 2022-03-25 DIAGNOSIS — Z12.4 SCREENING FOR CERVICAL CANCER: ICD-10-CM

## 2022-03-25 LAB
CYTOLOGY REG CYTOL: NORMAL
HPV HR 12 DNA CVX QL NAA+PROBE: NEGATIVE
HPV16 DNA SPEC QL NAA+PROBE: NEGATIVE
HPV18 DNA SPEC QL NAA+PROBE: NEGATIVE
SPECIMEN SOURCE: NORMAL

## 2022-04-01 ENCOUNTER — HOSPITAL ENCOUNTER (OUTPATIENT)
Dept: RADIOLOGY | Facility: MEDICAL CENTER | Age: 42
End: 2022-04-01
Attending: PHYSICIAN ASSISTANT
Payer: COMMERCIAL

## 2022-04-01 DIAGNOSIS — Z12.31 ENCOUNTER FOR SCREENING MAMMOGRAM FOR BREAST CANCER: ICD-10-CM

## 2022-04-01 PROCEDURE — 77063 BREAST TOMOSYNTHESIS BI: CPT

## 2022-12-01 ENCOUNTER — HOSPITAL ENCOUNTER (EMERGENCY)
Facility: MEDICAL CENTER | Age: 42
End: 2022-12-01
Attending: EMERGENCY MEDICINE
Payer: COMMERCIAL

## 2022-12-01 ENCOUNTER — APPOINTMENT (OUTPATIENT)
Dept: RADIOLOGY | Facility: MEDICAL CENTER | Age: 42
End: 2022-12-01
Attending: EMERGENCY MEDICINE
Payer: COMMERCIAL

## 2022-12-01 VITALS
BODY MASS INDEX: 22.4 KG/M2 | DIASTOLIC BLOOD PRESSURE: 68 MMHG | HEART RATE: 58 BPM | TEMPERATURE: 98.4 F | RESPIRATION RATE: 16 BRPM | SYSTOLIC BLOOD PRESSURE: 106 MMHG | OXYGEN SATURATION: 98 % | WEIGHT: 151.24 LBS | HEIGHT: 69 IN

## 2022-12-01 DIAGNOSIS — R07.9 CHEST PAIN WITH LOW RISK FOR CARDIAC ETIOLOGY: ICD-10-CM

## 2022-12-01 DIAGNOSIS — R07.9 CHEST PAIN, UNSPECIFIED TYPE: Primary | ICD-10-CM

## 2022-12-01 LAB
ALBUMIN SERPL BCP-MCNC: 4.5 G/DL (ref 3.2–4.9)
ALBUMIN/GLOB SERPL: 1.6 G/DL
ALP SERPL-CCNC: 49 U/L (ref 30–99)
ALT SERPL-CCNC: 13 U/L (ref 2–50)
ANION GAP SERPL CALC-SCNC: 10 MMOL/L (ref 7–16)
AST SERPL-CCNC: 20 U/L (ref 12–45)
BASOPHILS # BLD AUTO: 0.6 % (ref 0–1.8)
BASOPHILS # BLD: 0.04 K/UL (ref 0–0.12)
BILIRUB SERPL-MCNC: 0.7 MG/DL (ref 0.1–1.5)
BUN SERPL-MCNC: 6 MG/DL (ref 8–22)
CALCIUM SERPL-MCNC: 9.5 MG/DL (ref 8.4–10.2)
CHLORIDE SERPL-SCNC: 103 MMOL/L (ref 96–112)
CO2 SERPL-SCNC: 22 MMOL/L (ref 20–33)
CREAT SERPL-MCNC: 0.63 MG/DL (ref 0.5–1.4)
D DIMER PPP IA.FEU-MCNC: 0.32 UG/ML (FEU) (ref 0–0.5)
EKG IMPRESSION: NORMAL
EOSINOPHIL # BLD AUTO: 0.02 K/UL (ref 0–0.51)
EOSINOPHIL NFR BLD: 0.3 % (ref 0–6.9)
ERYTHROCYTE [DISTWIDTH] IN BLOOD BY AUTOMATED COUNT: 38.5 FL (ref 35.9–50)
GFR SERPLBLD CREATININE-BSD FMLA CKD-EPI: 114 ML/MIN/1.73 M 2
GLOBULIN SER CALC-MCNC: 2.9 G/DL (ref 1.9–3.5)
GLUCOSE SERPL-MCNC: 109 MG/DL (ref 65–99)
HCG SERPL QL: NEGATIVE
HCT VFR BLD AUTO: 39.3 % (ref 37–47)
HGB BLD-MCNC: 13.6 G/DL (ref 12–16)
IMM GRANULOCYTES # BLD AUTO: 0.02 K/UL (ref 0–0.11)
IMM GRANULOCYTES NFR BLD AUTO: 0.3 % (ref 0–0.9)
LYMPHOCYTES # BLD AUTO: 1.66 K/UL (ref 1–4.8)
LYMPHOCYTES NFR BLD: 26.3 % (ref 22–41)
MCH RBC QN AUTO: 30.5 PG (ref 27–33)
MCHC RBC AUTO-ENTMCNC: 34.6 G/DL (ref 33.6–35)
MCV RBC AUTO: 88.1 FL (ref 81.4–97.8)
MONOCYTES # BLD AUTO: 0.47 K/UL (ref 0–0.85)
MONOCYTES NFR BLD AUTO: 7.4 % (ref 0–13.4)
NEUTROPHILS # BLD AUTO: 4.1 K/UL (ref 2–7.15)
NEUTROPHILS NFR BLD: 65.1 % (ref 44–72)
NRBC # BLD AUTO: 0 K/UL
NRBC BLD-RTO: 0 /100 WBC
NT-PROBNP SERPL IA-MCNC: 45 PG/ML (ref 0–125)
PLATELET # BLD AUTO: 280 K/UL (ref 164–446)
PMV BLD AUTO: 9.1 FL (ref 9–12.9)
POTASSIUM SERPL-SCNC: 3.7 MMOL/L (ref 3.6–5.5)
PROT SERPL-MCNC: 7.4 G/DL (ref 6–8.2)
RBC # BLD AUTO: 4.46 M/UL (ref 4.2–5.4)
SODIUM SERPL-SCNC: 135 MMOL/L (ref 135–145)
TROPONIN T SERPL-MCNC: <6 NG/L (ref 6–19)
WBC # BLD AUTO: 6.3 K/UL (ref 4.8–10.8)

## 2022-12-01 PROCEDURE — 85025 COMPLETE CBC W/AUTO DIFF WBC: CPT

## 2022-12-01 PROCEDURE — 71045 X-RAY EXAM CHEST 1 VIEW: CPT

## 2022-12-01 PROCEDURE — 84484 ASSAY OF TROPONIN QUANT: CPT

## 2022-12-01 PROCEDURE — 83880 ASSAY OF NATRIURETIC PEPTIDE: CPT

## 2022-12-01 PROCEDURE — 85379 FIBRIN DEGRADATION QUANT: CPT

## 2022-12-01 PROCEDURE — 80053 COMPREHEN METABOLIC PANEL: CPT

## 2022-12-01 PROCEDURE — 36415 COLL VENOUS BLD VENIPUNCTURE: CPT

## 2022-12-01 PROCEDURE — 93005 ELECTROCARDIOGRAM TRACING: CPT | Performed by: EMERGENCY MEDICINE

## 2022-12-01 PROCEDURE — 99284 EMERGENCY DEPT VISIT MOD MDM: CPT

## 2022-12-01 PROCEDURE — 84703 CHORIONIC GONADOTROPIN ASSAY: CPT

## 2022-12-01 ASSESSMENT — FIBROSIS 4 INDEX: FIB4 SCORE: 0.84

## 2022-12-01 ASSESSMENT — PAIN DESCRIPTION - DESCRIPTORS: DESCRIPTORS: SHARP

## 2022-12-01 NOTE — ED TRIAGE NOTES
Chief Complaint   Patient presents with    Chest Pain    Shortness of Breath     Sharp chest pain and sob x 4 days. Not worse with activity.  Dizziness yesterday, denies syncope.    [facility]

## 2022-12-01 NOTE — ED PROVIDER NOTES
ED Provider Note     2022  3:27 PM    Means of Arrival: Walk In  History obtained by: patient  Limitations: None  PCP: Yahaira Amador  CODE STATUS: Full    CHIEF COMPLAINT  Chief Complaint   Patient presents with    Chest Pain    Shortness of Breath     Sharp chest pain and sob x 4 days. Not worse with activity.  Dizziness yesterday, denies syncope.        HPI  Hailey Machuca is a 42 y.o. female previously healthy but with significant family history of heart disease who presents with concerns of upper anterior chest discomfort and dyspnea symptom for the past week and a half.  She cannot remember exactly what she was doing when the symptoms started.  The most significant episode was yesterday while at work.  She was having a hard time speaking and it sounds like this was in front of her people.  She was feeling short of breath like she could not breathe.  She also had a similar feeling of shortness of breath when laying in bed at night.  She describes anterior chest pain as a sharp discomfort pressure-like sensation.  No radiating symptoms.  She does not have any worsening symptoms with exertion.  She says she has been busy at work but not overly stressed.  She does not take any hormones.  No recent surgeries.  No known cancers.  No extremity or facial edema.    REVIEW OF SYSTEMS  Review of Systems   All other systems reviewed and are negative.  See HPI for further details.    PAST MEDICAL HISTORY  Previously healthy    FAMILY HISTORY  Father  at a young age.  Multiple 5 members with heart disease.    SOCIAL HISTORY  Social History     Tobacco Use    Smoking status: Never    Smokeless tobacco: Never   Vaping Use    Vaping Use: Never used   Substance and Sexual Activity    Alcohol use: Yes     Comment: occasional     Drug use: Never    Sexual activity: Not on file     Comment: Mirena 23       SURGICAL HISTORY   has a past surgical history that includes tonsillectomy and dental  "extraction(s).    CURRENT MEDICATIONS  Home Medications    **Home medications have not yet been reviewed for this encounter**         ALLERGIES  Allergies   Allergen Reactions    Penicillins Anaphylaxis    Sulfa Drugs Anaphylaxis       PHYSICAL EXAM  VITAL SIGNS: /80   Pulse 70   Temp 36.8 °C (98.2 °F) (Temporal)   Resp 16   Ht 1.753 m (5' 9\")   Wt 68.6 kg (151 lb 3.8 oz)   SpO2 99%   BMI 22.33 kg/m²     Pulse ox interpretation: I interpret this pulse ox as normal.  Constitutional: Alert in no apparent distress.  HENT: No signs of trauma, Bilateral external ears normal, Nose normal.   Eyes: Pupils are equal, Conjunctiva normal, Non-icteric.   Neck: Normal range of motion, No tenderness, Supple, No stridor.  No JVD.  Lymphatic: No lymphadenopathy noted.   Cardiovascular: Regular rate and rhythm, no murmurs. Symmetric distal pulses. No cyanosis of extremities. No peripheral edema of extremities.  Thorax & Lungs: Normal breath sounds, No respiratory distress, No wheezing, No chest tenderness.   Abdomen: Soft no tenderness.  Skin: Warm, Dry, No erythema, No rash.   Back: No midline bony tenderness, No CVA tenderness.   Musculoskeletal: Good range of motion in all major joints. No tenderness to palpation or major deformities noted.   Neurologic: Alert , Normal motor function, Normal sensory function, No focal deficits noted.   Psychiatric: Tearful.  Physical Exam      DIAGNOSTIC STUDIES / PROCEDURES    EKG  Results for orders placed or performed during the hospital encounter of 22   EKG   Result Value Ref Range    Report       [facility] Emergency Dept.    Test Date:  2022  Pt Name:    ЕКАТЕРИНА CONNOR                Department: [facility]  MRN:        [id number]                      Room:  Gender:     Female                       Technician: [id number]  :        1980                   Requested By:MARGARITA FRANCO  Order #:    [id number]                    Reading MD: Rashanw " GINO George MD    Measurements  Intervals                                Axis  Rate:       75                           P:          66  VA:         143                          QRS:        61  QRSD:       86                           T:          36  QT:         376  QTc:        420    Interpretive Statements  Sinus rhythm  Rate 75  Normal intervals  No ST elevation or depression.  Normal T waves  Impression: Normal sinus rhythm EKG without any suggestion of ischemia  No previous ECG available for comparison  Electronically Signed On 12-1-2022 15:27:09 PST by Rashawn George II, MD           LABS  Pertinent Labs & Imaging studies reviewed. (See chart for details)    RADIOLOGY  Pertinent Labs & Imaging studies reviewed. (See chart for details)    COURSE & MEDICAL DECISION MAKING  Pertinent Labs & Imaging studies reviewed. (See chart for details)    3:27 PM This is an emergent evaluation of a  42 y.o. female who presents with concerns of upper anterior chest pain, shortness of breath especially when speaking and laying down at night. Physical exam unremarkable.  Vitals are normal.  No hypoxia.  EKG done in triage is normal.  Per nursing protocol chest pain work-up was ordered prior to my evaluation.  I have reviewed results.  Troponin is undetectable.  No anemia.  No leukocytosis.  Normal renal function.  Chest x-ray is normal.  Given these normal results I think MI is unlikely.  Heart score is 1.  By PERC criteria for PE very unlikely.  However we will look for further reassurance with a D-dimer.  No signs of pneumonia.  Lung sounds not revealing any reactive airway disease.  proBNP we done but I also think that CHF is unlikely.  She is very healthy appearing.  She is understandably concerned given family history of father's death at a young age, heart disease.  HEART Score 1.      5:06 PM  D-dimer negative.  proBNP normal.  Vitals are remained stable.  I did not see an indication for repeat troponin given  symptoms have been ongoing for over 24 hours and we have undetectable troponin at this time with a normal EKG.  This is low risk chest pain heart score is 1 based on family history.  I have given her referral to cardiology clinic.  I would like her to return to the ER if she has any significant worsening, extremity swelling, other serious symptoms.  We reviewed all of the diagnostic findings, my assessment and she is agreeable with plan for discharge follow-up with cardiology clinic.     The patient will return for worsening symptoms and is stable at the time of discharge. The patient verbalizes understanding. Guidance was provided on appropriate use of medications including driving under the influence, overdose, and side effects.         FINAL IMPRESSION    ICD-10-CM   1. Chest pain, unspecified type Active R07.9   2. Chest pain with low risk for cardiac etiology  R07.9            This dictation was created using voice recognition software. The accuracy of the dictation is limited to the abilities of the software. I expect there may be some errors of grammar and possibly content. The nursing notes were reviewed and certain aspects of this information were incorporated into this note.    Electronically signed by: Rashawn George II, M.D., 12/1/2022 3:27 PM

## 2022-12-02 NOTE — ED NOTES
No available tele monitor for patient, md notified.  No 2nd troponin to be drawn per conversation with MD.

## 2022-12-02 NOTE — ED NOTES
IV discontinued with cathlon intact    Discharge home with instructions, follow up care and call the Cardiology office phone number provided, reviewed and given to patient with verbal understanding.      Ambulatory with a steady gait and stable condition

## 2022-12-09 ENCOUNTER — TELEPHONE (OUTPATIENT)
Dept: HEALTH INFORMATION MANAGEMENT | Facility: OTHER | Age: 42
End: 2022-12-09
Payer: COMMERCIAL

## 2023-04-03 ENCOUNTER — APPOINTMENT (OUTPATIENT)
Dept: RADIOLOGY | Facility: MEDICAL CENTER | Age: 43
End: 2023-04-03
Attending: INTERNAL MEDICINE
Payer: COMMERCIAL

## 2023-04-03 DIAGNOSIS — Z12.31 ENCOUNTER FOR MAMMOGRAM TO ESTABLISH BASELINE MAMMOGRAM: ICD-10-CM

## 2023-04-03 PROCEDURE — 77067 SCR MAMMO BI INCL CAD: CPT

## 2023-12-13 SDOH — HEALTH STABILITY: MENTAL HEALTH
STRESS IS WHEN SOMEONE FEELS TENSE, NERVOUS, ANXIOUS, OR CAN'T SLEEP AT NIGHT BECAUSE THEIR MIND IS TROUBLED. HOW STRESSED ARE YOU?: ONLY A LITTLE

## 2023-12-13 SDOH — ECONOMIC STABILITY: INCOME INSECURITY: IN THE LAST 12 MONTHS, WAS THERE A TIME WHEN YOU WERE NOT ABLE TO PAY THE MORTGAGE OR RENT ON TIME?: NO

## 2023-12-13 SDOH — ECONOMIC STABILITY: FOOD INSECURITY: WITHIN THE PAST 12 MONTHS, THE FOOD YOU BOUGHT JUST DIDN'T LAST AND YOU DIDN'T HAVE MONEY TO GET MORE.: NEVER TRUE

## 2023-12-13 SDOH — HEALTH STABILITY: PHYSICAL HEALTH: ON AVERAGE, HOW MANY DAYS PER WEEK DO YOU ENGAGE IN MODERATE TO STRENUOUS EXERCISE (LIKE A BRISK WALK)?: 3 DAYS

## 2023-12-13 SDOH — ECONOMIC STABILITY: FOOD INSECURITY: WITHIN THE PAST 12 MONTHS, YOU WORRIED THAT YOUR FOOD WOULD RUN OUT BEFORE YOU GOT MONEY TO BUY MORE.: NEVER TRUE

## 2023-12-13 SDOH — ECONOMIC STABILITY: INCOME INSECURITY: HOW HARD IS IT FOR YOU TO PAY FOR THE VERY BASICS LIKE FOOD, HOUSING, MEDICAL CARE, AND HEATING?: NOT HARD AT ALL

## 2023-12-13 SDOH — HEALTH STABILITY: PHYSICAL HEALTH: ON AVERAGE, HOW MANY MINUTES DO YOU ENGAGE IN EXERCISE AT THIS LEVEL?: 30 MIN

## 2023-12-13 SDOH — ECONOMIC STABILITY: HOUSING INSECURITY: IN THE LAST 12 MONTHS, HOW MANY PLACES HAVE YOU LIVED?: 1

## 2023-12-13 ASSESSMENT — SOCIAL DETERMINANTS OF HEALTH (SDOH)
HOW OFTEN DO YOU ATTEND CHURCH OR RELIGIOUS SERVICES?: NEVER
HOW OFTEN DO YOU HAVE A DRINK CONTAINING ALCOHOL: 2-3 TIMES A WEEK
DO YOU BELONG TO ANY CLUBS OR ORGANIZATIONS SUCH AS CHURCH GROUPS UNIONS, FRATERNAL OR ATHLETIC GROUPS, OR SCHOOL GROUPS?: NO
IN A TYPICAL WEEK, HOW MANY TIMES DO YOU TALK ON THE PHONE WITH FAMILY, FRIENDS, OR NEIGHBORS?: TWICE A WEEK
HOW HARD IS IT FOR YOU TO PAY FOR THE VERY BASICS LIKE FOOD, HOUSING, MEDICAL CARE, AND HEATING?: NOT HARD AT ALL
HOW OFTEN DO YOU ATTENT MEETINGS OF THE CLUB OR ORGANIZATION YOU BELONG TO?: NEVER
WITHIN THE PAST 12 MONTHS, YOU WORRIED THAT YOUR FOOD WOULD RUN OUT BEFORE YOU GOT THE MONEY TO BUY MORE: NEVER TRUE
HOW MANY DRINKS CONTAINING ALCOHOL DO YOU HAVE ON A TYPICAL DAY WHEN YOU ARE DRINKING: 1 OR 2
DO YOU BELONG TO ANY CLUBS OR ORGANIZATIONS SUCH AS CHURCH GROUPS UNIONS, FRATERNAL OR ATHLETIC GROUPS, OR SCHOOL GROUPS?: NO
IN A TYPICAL WEEK, HOW MANY TIMES DO YOU TALK ON THE PHONE WITH FAMILY, FRIENDS, OR NEIGHBORS?: TWICE A WEEK
HOW OFTEN DO YOU ATTEND CHURCH OR RELIGIOUS SERVICES?: NEVER
HOW OFTEN DO YOU GET TOGETHER WITH FRIENDS OR RELATIVES?: PATIENT DECLINED
HOW OFTEN DO YOU GET TOGETHER WITH FRIENDS OR RELATIVES?: PATIENT DECLINED
HOW OFTEN DO YOU HAVE SIX OR MORE DRINKS ON ONE OCCASION: NEVER
HOW OFTEN DO YOU ATTENT MEETINGS OF THE CLUB OR ORGANIZATION YOU BELONG TO?: NEVER

## 2023-12-13 ASSESSMENT — LIFESTYLE VARIABLES
HOW MANY STANDARD DRINKS CONTAINING ALCOHOL DO YOU HAVE ON A TYPICAL DAY: 1 OR 2
AUDIT-C TOTAL SCORE: 3
SKIP TO QUESTIONS 9-10: 1
HOW OFTEN DO YOU HAVE A DRINK CONTAINING ALCOHOL: 2-3 TIMES A WEEK
HOW OFTEN DO YOU HAVE SIX OR MORE DRINKS ON ONE OCCASION: NEVER

## 2023-12-15 ENCOUNTER — OFFICE VISIT (OUTPATIENT)
Dept: MEDICAL GROUP | Facility: LAB | Age: 43
End: 2023-12-15
Payer: COMMERCIAL

## 2023-12-15 VITALS
HEIGHT: 70 IN | DIASTOLIC BLOOD PRESSURE: 62 MMHG | WEIGHT: 145.5 LBS | HEART RATE: 78 BPM | OXYGEN SATURATION: 99 % | TEMPERATURE: 97.1 F | RESPIRATION RATE: 16 BRPM | BODY MASS INDEX: 20.83 KG/M2 | SYSTOLIC BLOOD PRESSURE: 118 MMHG

## 2023-12-15 DIAGNOSIS — Z23 NEED FOR VACCINATION: ICD-10-CM

## 2023-12-15 DIAGNOSIS — Z13.1 DIABETES MELLITUS SCREENING: ICD-10-CM

## 2023-12-15 DIAGNOSIS — Z13.220 LIPID SCREENING: ICD-10-CM

## 2023-12-15 DIAGNOSIS — Z76.89 ENCOUNTER TO ESTABLISH CARE: ICD-10-CM

## 2023-12-15 DIAGNOSIS — Z13.29 SCREENING FOR THYROID DISORDER: ICD-10-CM

## 2023-12-15 DIAGNOSIS — K21.9 GASTROESOPHAGEAL REFLUX DISEASE, UNSPECIFIED WHETHER ESOPHAGITIS PRESENT: ICD-10-CM

## 2023-12-15 DIAGNOSIS — Z78.9 VEGETARIAN DIET: ICD-10-CM

## 2023-12-15 PROBLEM — T83.32XA IUD STRINGS LOST: Status: RESOLVED | Noted: 2021-03-11 | Resolved: 2023-12-15

## 2023-12-15 PROBLEM — R21 RASH AND NONSPECIFIC SKIN ERUPTION: Status: RESOLVED | Noted: 2022-03-24 | Resolved: 2023-12-15

## 2023-12-15 PROCEDURE — 3078F DIAST BP <80 MM HG: CPT | Performed by: PHYSICIAN ASSISTANT

## 2023-12-15 PROCEDURE — 3074F SYST BP LT 130 MM HG: CPT | Performed by: PHYSICIAN ASSISTANT

## 2023-12-15 PROCEDURE — 99214 OFFICE O/P EST MOD 30 MIN: CPT | Mod: 25 | Performed by: PHYSICIAN ASSISTANT

## 2023-12-15 PROCEDURE — 90715 TDAP VACCINE 7 YRS/> IM: CPT | Performed by: PHYSICIAN ASSISTANT

## 2023-12-15 PROCEDURE — 90471 IMMUNIZATION ADMIN: CPT | Performed by: PHYSICIAN ASSISTANT

## 2023-12-15 RX ORDER — SUCRALFATE 1 G/1
1 TABLET ORAL
Qty: 40 TABLET | Refills: 0 | Status: SHIPPED | OUTPATIENT
Start: 2023-12-15 | End: 2023-12-25

## 2023-12-15 ASSESSMENT — PATIENT HEALTH QUESTIONNAIRE - PHQ9: CLINICAL INTERPRETATION OF PHQ2 SCORE: 0

## 2023-12-15 ASSESSMENT — FIBROSIS 4 INDEX: FIB4 SCORE: 0.81

## 2023-12-15 NOTE — PROGRESS NOTES
Subjective:     CC:  There were no encounter diagnoses.    HISTORY OF THE PRESENT ILLNESS: Patient is a 43 y.o. female. This pleasant patient is here today to establish care and discuss sore throat, GI concerns. His/her prior PCP was Yahaira Amador PA-C.    Sore Throat  -x 1 month, also associated with burning at the back of the palette  -recently completed 2 weeks of prilosec with improvement. Also doing allergy shots  -hx of chronic RUQ pain, prev GI eval 3 years ago with endoscopic, HIDA scan, right upper quadrant ultrasound.  Findings were significant for esophagitis.  Advised omeprazole 20 mg daily with calcium and vitamin D supplementation      Health Maintenance     - Dyslipidemia (30-45): ordered  - Diabetes (HTN, HLD, BMI >25): ordered  - Depression screening (PHQ-2 and/or PHQ-9): neg  - Dental: UTD  - Eye: UTD  Diet: vegetarian x 17 years  Exercise: moderate 3x-5x per week  Substance Use: denies  Tobacco Use/counseling: denies  Safe in relationship: yes  Seat belts, bike helmet, gun safety discussed.  Sun protection used.       Cancer screening  - Cervical CA (21-65): due 2025  -  HX Abnormal pap/HPV: none  - Breast CA: mammo (required 50-73yo) or starting 40 (ACOG, ACR), 45 (ACS), 50 (USPTF): due 04/2024  - Skin cancer screening: follows with derm annually      Infectious disease screening/Immunizations  --STI/HIV Screening: completed  --Practices safe sex.  --Hepatitis C Screening (18 to 78 yo): completed  --Immunizations: tdap given today      Current Outpatient Medications Ordered in Epic   Medication Sig Dispense Refill    mupirocin (BACTROBAN) 2 % Ointment Apply 1 Application topically 2 times a day. 22 g 0     No current Epic-ordered facility-administered medications on file.       ROS:   Gen: no fevers/chills, no changes in weight  Eyes: no changes in vision  ENT: no sore throat, no hearing loss, no bloody nose  Pulm: no sob, no cough  CV: no chest pain, no palpitations  GI: no  "nausea/vomiting, no diarrhea  : no dysuria  MSk: no myalgias  Skin: no rash  Neuro: no headaches, no numbness/tingling  Heme/Lymph: no easy bruising      Objective:       Exam: /62 (BP Location: Right arm, Patient Position: Sitting, BP Cuff Size: Adult)   Pulse 78   Temp 36.2 °C (97.1 °F) (Temporal)   Resp 16   Ht 1.785 m (5' 10.28\")   Wt 66 kg (145 lb 8.1 oz)   SpO2 99%  Body mass index is 20.71 kg/m².    General: Normal appearing. No distress.  HEENT: Normocephalic. Eyes conjunctiva clear lids without ptosis, pupils equal and reactive to light accommodation, ears normal shape and contour, canals are clear bilaterally, tympanic membranes are benign, nasal mucosa benign, oropharynx is without erythema, edema or exudates.   Neck: Supple without JVD or bruit. Thyroid is not enlarged.  Pulmonary: Clear to ausculation.  Normal effort. No rales, ronchi, or wheezing.  Cardiovascular: Regular rate and rhythm without murmur. Carotid and radial pulses are intact and equal bilaterally.  Abdomen: Soft, nontender, nondistended. Normal bowel sounds. Liver and spleen are not palpable  Neurologic: Grossly nonfocal  Lymph: No cervical or supraclavicular lymph nodes are palpable  Skin: Warm and dry.  No obvious lesions.  Musculoskeletal: Normal gait. No extremity cyanosis, clubbing, or edema.  Psych: Normal mood and affect. Alert and oriented x3. Judgment and insight is normal.      Assessment & Plan:   43 y.o. female with the following -    1. Encounter to establish care  Labs per orders  Vaccinations per orders  Screenings per orders      2. Gastroesophageal reflux disease, unspecified whether esophagitis present  Suspect sore throat is likely secondary to uncontrolled acid reflux.  We will refer patient back to gastroenterology for likely updated endoscopy to assess for esophagitis/gastritis/gastric ulcers or other abnormality.  Advised patient continue PPI for now.  She does note concern about long-term use of this " medication.  Discussed risk and benefits of PPI versus untreated GERD.  We also have sent a prescription for sucralfate to try as well.  She is due for updated labs to assess for anemia, B12 deficiency  - Referral to Gastroenterology  - VIT B12,  FOLIC ACID  - sucralfate (CARAFATE) 1 GM Tab; Take 1 Tablet by mouth 4 Times a Day,Before Meals and at Bedtime for 10 days.  Dispense: 40 Tablet; Refill: 0    3. Vegetarian diet  - VIT B12,  FOLIC ACID    4. Lipid screening  - CBC WITH DIFFERENTIAL; Future  - Comp Metabolic Panel; Future  - Lipid Profile; Future    5. Screening for thyroid disorder  - TSH WITH REFLEX TO FT4; Future    6. Diabetes mellitus screening  - HEMOGLOBIN A1C; Future    7. Need for vaccination  - Tdap =>6yo IM      I spent a total of 30 minutes with record review, exam, communication with the patient, communication with other providers, and documentation of this encounter.    No follow-ups on file.    Please note that this dictation was created using voice recognition software. I have made every reasonable attempt to correct obvious errors, but I expect that there are errors of grammar and possibly content that I did not discover before finalizing the note.

## 2023-12-19 ENCOUNTER — HOSPITAL ENCOUNTER (OUTPATIENT)
Dept: LAB | Facility: MEDICAL CENTER | Age: 43
End: 2023-12-19
Attending: PHYSICIAN ASSISTANT
Payer: COMMERCIAL

## 2023-12-19 DIAGNOSIS — Z13.1 DIABETES MELLITUS SCREENING: ICD-10-CM

## 2023-12-19 DIAGNOSIS — Z13.29 SCREENING FOR THYROID DISORDER: ICD-10-CM

## 2023-12-19 DIAGNOSIS — Z13.220 LIPID SCREENING: ICD-10-CM

## 2023-12-19 LAB
ALBUMIN SERPL BCP-MCNC: 4 G/DL (ref 3.2–4.9)
ALBUMIN/GLOB SERPL: 1.7 G/DL
ALP SERPL-CCNC: 45 U/L (ref 30–99)
ALT SERPL-CCNC: 9 U/L (ref 2–50)
ANION GAP SERPL CALC-SCNC: 7 MMOL/L (ref 7–16)
AST SERPL-CCNC: 14 U/L (ref 12–45)
BASOPHILS # BLD AUTO: 0.9 % (ref 0–1.8)
BASOPHILS # BLD: 0.04 K/UL (ref 0–0.12)
BILIRUB SERPL-MCNC: 0.5 MG/DL (ref 0.1–1.5)
BUN SERPL-MCNC: 4 MG/DL (ref 8–22)
CALCIUM ALBUM COR SERPL-MCNC: 8.7 MG/DL (ref 8.5–10.5)
CALCIUM SERPL-MCNC: 8.7 MG/DL (ref 8.5–10.5)
CHLORIDE SERPL-SCNC: 104 MMOL/L (ref 96–112)
CHOLEST SERPL-MCNC: 155 MG/DL (ref 100–199)
CO2 SERPL-SCNC: 25 MMOL/L (ref 20–33)
CREAT SERPL-MCNC: 0.55 MG/DL (ref 0.5–1.4)
EOSINOPHIL # BLD AUTO: 0.12 K/UL (ref 0–0.51)
EOSINOPHIL NFR BLD: 2.6 % (ref 0–6.9)
ERYTHROCYTE [DISTWIDTH] IN BLOOD BY AUTOMATED COUNT: 40.8 FL (ref 35.9–50)
EST. AVERAGE GLUCOSE BLD GHB EST-MCNC: 97 MG/DL
FASTING STATUS PATIENT QL REPORTED: NORMAL
GFR SERPLBLD CREATININE-BSD FMLA CKD-EPI: 117 ML/MIN/1.73 M 2
GLOBULIN SER CALC-MCNC: 2.3 G/DL (ref 1.9–3.5)
GLUCOSE SERPL-MCNC: 81 MG/DL (ref 65–99)
HBA1C MFR BLD: 5 % (ref 4–5.6)
HCT VFR BLD AUTO: 37.3 % (ref 37–47)
HDLC SERPL-MCNC: 53 MG/DL
HGB BLD-MCNC: 12.6 G/DL (ref 12–16)
IMM GRANULOCYTES # BLD AUTO: 0.01 K/UL (ref 0–0.11)
IMM GRANULOCYTES NFR BLD AUTO: 0.2 % (ref 0–0.9)
LDLC SERPL CALC-MCNC: 81 MG/DL
LYMPHOCYTES # BLD AUTO: 1.76 K/UL (ref 1–4.8)
LYMPHOCYTES NFR BLD: 38.6 % (ref 22–41)
MCH RBC QN AUTO: 29.6 PG (ref 27–33)
MCHC RBC AUTO-ENTMCNC: 33.8 G/DL (ref 32.2–35.5)
MCV RBC AUTO: 87.8 FL (ref 81.4–97.8)
MONOCYTES # BLD AUTO: 0.41 K/UL (ref 0–0.85)
MONOCYTES NFR BLD AUTO: 9 % (ref 0–13.4)
NEUTROPHILS # BLD AUTO: 2.22 K/UL (ref 1.82–7.42)
NEUTROPHILS NFR BLD: 48.7 % (ref 44–72)
NRBC # BLD AUTO: 0 K/UL
NRBC BLD-RTO: 0 /100 WBC (ref 0–0.2)
PLATELET # BLD AUTO: 252 K/UL (ref 164–446)
PMV BLD AUTO: 9.8 FL (ref 9–12.9)
POTASSIUM SERPL-SCNC: 3.8 MMOL/L (ref 3.6–5.5)
PROT SERPL-MCNC: 6.3 G/DL (ref 6–8.2)
RBC # BLD AUTO: 4.25 M/UL (ref 4.2–5.4)
SODIUM SERPL-SCNC: 136 MMOL/L (ref 135–145)
TRIGL SERPL-MCNC: 106 MG/DL (ref 0–149)
TSH SERPL DL<=0.005 MIU/L-ACNC: 1.31 UIU/ML (ref 0.38–5.33)
WBC # BLD AUTO: 4.6 K/UL (ref 4.8–10.8)

## 2023-12-19 PROCEDURE — 80061 LIPID PANEL: CPT

## 2023-12-19 PROCEDURE — 85025 COMPLETE CBC W/AUTO DIFF WBC: CPT

## 2023-12-19 PROCEDURE — 83036 HEMOGLOBIN GLYCOSYLATED A1C: CPT

## 2023-12-19 PROCEDURE — 84443 ASSAY THYROID STIM HORMONE: CPT

## 2023-12-19 PROCEDURE — 36415 COLL VENOUS BLD VENIPUNCTURE: CPT

## 2023-12-19 PROCEDURE — 80053 COMPREHEN METABOLIC PANEL: CPT

## 2023-12-29 ENCOUNTER — HOSPITAL ENCOUNTER (OUTPATIENT)
Dept: RADIOLOGY | Facility: MEDICAL CENTER | Age: 43
End: 2023-12-29
Attending: NURSE PRACTITIONER
Payer: COMMERCIAL

## 2023-12-29 DIAGNOSIS — T28.0XXA BURN OF MOUTH OR PHARYNX, INITIAL ENCOUNTER: ICD-10-CM

## 2023-12-29 DIAGNOSIS — K21.00 GASTROESOPHAGEAL REFLUX DISEASE WITH ESOPHAGITIS WITHOUT HEMORRHAGE: ICD-10-CM

## 2023-12-29 DIAGNOSIS — J30.9 ALLERGIC RHINITIS, UNSPECIFIED SEASONALITY, UNSPECIFIED TRIGGER: ICD-10-CM

## 2023-12-29 PROCEDURE — 700117 HCHG RX CONTRAST REV CODE 255: Performed by: RADIOLOGY

## 2023-12-29 PROCEDURE — 74220 X-RAY XM ESOPHAGUS 1CNTRST: CPT

## 2023-12-29 RX ADMIN — BARIUM SULFATE 700 MG: 700 TABLET ORAL at 09:57

## 2024-02-23 ENCOUNTER — TELEMEDICINE (OUTPATIENT)
Dept: MEDICAL GROUP | Facility: LAB | Age: 44
End: 2024-02-23
Payer: COMMERCIAL

## 2024-02-23 VITALS — BODY MASS INDEX: 20.71 KG/M2 | HEIGHT: 70 IN

## 2024-02-23 DIAGNOSIS — Z71.84 COUNSELING ABOUT TRAVEL: ICD-10-CM

## 2024-02-23 PROCEDURE — 99212 OFFICE O/P EST SF 10 MIN: CPT | Mod: 95 | Performed by: PHYSICIAN ASSISTANT

## 2024-02-23 RX ORDER — DEXAMETHASONE 4 MG/1
TABLET ORAL
Qty: 14 TABLET | Refills: 0 | Status: SHIPPED | OUTPATIENT
Start: 2024-02-23

## 2024-02-23 ASSESSMENT — ENCOUNTER SYMPTOMS
EYES NEGATIVE: 1
NEUROLOGICAL NEGATIVE: 1
CONSTITUTIONAL NEGATIVE: 1
CARDIOVASCULAR NEGATIVE: 1
MUSCULOSKELETAL NEGATIVE: 1
RESPIRATORY NEGATIVE: 1

## 2024-02-23 ASSESSMENT — PATIENT HEALTH QUESTIONNAIRE - PHQ9: CLINICAL INTERPRETATION OF PHQ2 SCORE: 0

## 2024-02-23 NOTE — PROGRESS NOTES
"Virtual Visit: Established Patient   This visit was conducted via Zoom using secure and encrypted videoconferencing technology.   The patient was in their home in the [facility].    The patient's identity was confirmed and verbal consent was obtained for this virtual visit.    Subjective:   CC:   Chief Complaint   Patient presents with    Medication Refill     Rx for altitude sickness     Hailey Machuca is a 44 y.o. female presenting for evaluation and management of:    Planning for hiking trip in [place] in June  -remote location, 5 day hiking trip, in [facility] at [place] for 2 days prior  -15,000 elevation  -prev lived in [place]  -Denies previous history of altitude sickness or high-altitude health complications    ROS   Review of Systems   Constitutional: Negative.    HENT: Negative.     Eyes: Negative.    Respiratory: Negative.     Cardiovascular: Negative.    Genitourinary: Negative.    Musculoskeletal: Negative.    Skin: Negative.    Neurological: Negative.          Current medicines (including changes today)  Current Outpatient Medications   Medication Sig Dispense Refill    dexamethasone (DECADRON) 4 MG Tab 1 tablet BID, Start day of ascent and continue 2 to 3 days at maximum altitude but for no more than 7 days total 14 Tablet 0     No current facility-administered medications for this visit.       Patient Active Problem List    Diagnosis Date Noted    Preventative health care 03/24/2022    RUQ pain 03/22/2021        Objective:   Ht 1.785 m (5' 10.28\")   BMI 20.71 kg/m²     Physical Exam:  Constitutional: Alert, no distress, well-groomed.  Skin: No rashes in visible areas.  Eye: Round. Conjunctiva clear, lids normal. No icterus.   ENMT: Lips pink without lesions, good dentition, moist mucous membranes. Phonation normal.  Neck: No masses, no thyromegaly. Moves freely without pain.  Respiratory: Unlabored respiratory effort, no cough or audible wheeze  Psych: Alert and oriented x3, normal affect and " mood.     Assessment and Plan:   The following treatment plan was discussed:     1. Counseling about travel  - dexamethasone (DECADRON) 4 MG Tab; 1 tablet BID, Start day of ascent and continue 2 to 3 days at maximum altitude but for no more than 7 days total  Dispense: 14 Tablet; Refill: 0  Patient does have a history of anaphylactic reaction to sulfa medications.  Given this documented allergy history we will proceed with Decadron rather than Diamox for high-altitude travel prophylaxis    Follow-up: No follow-ups on file.

## 2024-03-29 ENCOUNTER — OFFICE VISIT (OUTPATIENT)
Dept: URGENT CARE | Facility: CLINIC | Age: 44
End: 2024-03-29
Payer: COMMERCIAL

## 2024-03-29 VITALS
OXYGEN SATURATION: 97 % | SYSTOLIC BLOOD PRESSURE: 114 MMHG | WEIGHT: 145 LBS | RESPIRATION RATE: 18 BRPM | HEIGHT: 70 IN | DIASTOLIC BLOOD PRESSURE: 60 MMHG | HEART RATE: 78 BPM | TEMPERATURE: 97.6 F | BODY MASS INDEX: 20.76 KG/M2

## 2024-03-29 DIAGNOSIS — J02.9 SORE THROAT: ICD-10-CM

## 2024-03-29 DIAGNOSIS — R22.1 NECK SWELLING: ICD-10-CM

## 2024-03-29 LAB — S PYO DNA SPEC NAA+PROBE: NOT DETECTED

## 2024-03-29 PROCEDURE — 99213 OFFICE O/P EST LOW 20 MIN: CPT | Performed by: NURSE PRACTITIONER

## 2024-03-29 PROCEDURE — 87651 STREP A DNA AMP PROBE: CPT | Performed by: NURSE PRACTITIONER

## 2024-03-29 PROCEDURE — 3074F SYST BP LT 130 MM HG: CPT | Performed by: NURSE PRACTITIONER

## 2024-03-29 PROCEDURE — 3078F DIAST BP <80 MM HG: CPT | Performed by: NURSE PRACTITIONER

## 2024-03-29 RX ORDER — PSEUDOEPHEDRINE HCL 30 MG
60 TABLET ORAL EVERY 4 HOURS PRN
COMMUNITY

## 2024-03-29 RX ORDER — CETIRIZINE HYDROCHLORIDE 10 MG/1
10 TABLET ORAL DAILY
COMMUNITY

## 2024-03-29 RX ORDER — METHYLPREDNISOLONE 4 MG/1
TABLET ORAL
Qty: 21 TABLET | Refills: 0 | Status: SHIPPED | OUTPATIENT
Start: 2024-03-29

## 2024-03-29 RX ORDER — DEXAMETHASONE SODIUM PHOSPHATE 10 MG/ML
10 INJECTION INTRAMUSCULAR; INTRAVENOUS ONCE
Status: COMPLETED | OUTPATIENT
Start: 2024-03-29 | End: 2024-03-29

## 2024-03-29 RX ADMIN — DEXAMETHASONE SODIUM PHOSPHATE 10 MG: 10 INJECTION INTRAMUSCULAR; INTRAVENOUS at 17:04

## 2024-03-29 ASSESSMENT — FIBROSIS 4 INDEX: FIB4 SCORE: .7777777777777777778

## 2024-04-04 ASSESSMENT — ENCOUNTER SYMPTOMS
CONSTITUTIONAL NEGATIVE: 1
GASTROINTESTINAL NEGATIVE: 1
NEUROLOGICAL NEGATIVE: 1
CARDIOVASCULAR NEGATIVE: 1
FEVER: 0
CHILLS: 0
MUSCULOSKELETAL NEGATIVE: 1
RESPIRATORY NEGATIVE: 1
EYES NEGATIVE: 1
SORE THROAT: 1
PSYCHIATRIC NEGATIVE: 1

## 2024-04-04 NOTE — PROGRESS NOTES
"Subjective:   Hailey Machuca is a 44 y.o. female who presents for Sore Throat (X 11/2023:ENT appt. on 5/02/24, swollen/burning throat, not alleviating with OTC medications and tea)      Patient presents for evaluation of sore throat, and neck pain and swelling which has been going on since November.  Patient was initially thought to have reflux and has had a completely negative workup by GI at this point.  She has tried PPI and H2 blockers without improvement.  She states she does have some difficulty swallowing but does not feel that food actually gets stuck.  She states she is pending appointment with an ENT but this is not for over a month.  She is frustrated about her lack of progress with her ongoing symptoms.  She has been doing some research and is wondering if steroids could help her symptoms.          Review of Systems   Constitutional: Negative.  Negative for chills and fever.   HENT:  Positive for sore throat.    Eyes: Negative.    Respiratory: Negative.     Cardiovascular: Negative.    Gastrointestinal: Negative.    Genitourinary: Negative.    Musculoskeletal: Negative.    Skin: Negative.    Neurological: Negative.    Endo/Heme/Allergies: Negative.    Psychiatric/Behavioral: Negative.     All other systems reviewed and are negative.      Medications, Allergies, and current problem list reviewed today in Epic.     Objective:     /60 (BP Location: Left arm, Patient Position: Sitting, BP Cuff Size: Adult)   Pulse 78   Temp 36.4 °C (97.6 °F) (Temporal)   Resp 18   Ht 1.778 m (5' 10\")   Wt 65.8 kg (145 lb)   SpO2 97%     Physical Exam  Vitals reviewed.   Constitutional:       General: She is not in acute distress.     Appearance: Normal appearance. She is not ill-appearing.   HENT:      Head: Normocephalic and atraumatic.      Right Ear: Tympanic membrane, ear canal and external ear normal.      Left Ear: Tympanic membrane, ear canal and external ear normal.      Nose: Nose normal.      " Mouth/Throat:      Mouth: Mucous membranes are moist.      Pharynx: Oropharynx is clear. Uvula midline. Posterior oropharyngeal erythema present. No pharyngeal swelling, oropharyngeal exudate or uvula swelling.   Eyes:      Extraocular Movements: Extraocular movements intact.      Conjunctiva/sclera: Conjunctivae normal.      Pupils: Pupils are equal, round, and reactive to light.   Neck:      Thyroid: No thyroid mass, thyromegaly or thyroid tenderness.      Trachea: Trachea and phonation normal.   Cardiovascular:      Rate and Rhythm: Normal rate and regular rhythm.   Pulmonary:      Effort: Pulmonary effort is normal.      Breath sounds: Normal breath sounds.   Abdominal:      General: Abdomen is flat.      Palpations: Abdomen is soft.   Musculoskeletal:         General: Normal range of motion.      Cervical back: Full passive range of motion without pain, normal range of motion and neck supple.   Lymphadenopathy:      Cervical: Cervical adenopathy present.      Right cervical: Superficial cervical adenopathy, deep cervical adenopathy and posterior cervical adenopathy present.   Skin:     General: Skin is warm and dry.      Capillary Refill: Capillary refill takes less than 2 seconds.   Neurological:      General: No focal deficit present.      Mental Status: She is alert.   Psychiatric:         Mood and Affect: Mood normal.         Behavior: Behavior normal.       Results for orders placed or performed in visit on 03/29/24   POCT GROUP A STREP, PCR   Result Value Ref Range    POC Group A Strep, PCR Not Detected Not Detected, Invalid       Assessment/Plan:     Diagnosis and associated orders:     1. Sore throat  POCT GROUP A STREP, PCR    dexamethasone (Decadron) injection (check route below) 10 mg    methylPREDNISolone (MEDROL DOSEPAK) 4 MG Tablet Therapy Pack    CT-SOFT TISSUE NECK W/O      2. Neck swelling  methylPREDNISolone (MEDROL DOSEPAK) 4 MG Tablet Therapy Pack    CT-SOFT TISSUE NECK W/O          Comments/MDM:     Discussed with patient that I do not have an answer to the origin of her throat/neck issues.  She is undergoing an appropriate workup, though this is slow going.  As she has an appt with ENT coming soon, I offered to get a CT of her neck as I do feel some fullness on the right side, and this may aid them with diagnostics for her.  I do not see a reason that trial of steroids for inflammation is contraindicated and patient was given some today.  She will keep track of whether or not this helps with her symptoms, and let her ENT provider know this as well.  I will let her know the results of her CT when available and any further recommendations based on that scan will be made at that time.  Patient verbalizes understanding and is in agreement with the plan of care.  She will go to ER if she develops any difficulty swallowing or any choking episodes.           Differential diagnosis, natural history, supportive care, and indications for immediate follow-up discussed.    Advised the patient to follow-up with the primary care physician for recheck, reevaluation, and consideration of further management.    Please note that this dictation was created using voice recognition software. I have made a reasonable attempt to correct obvious errors, but I expect that there are errors of grammar and possibly content that I did not discover before finalizing the note.    This note was electronically signed by RAMIRO Coleman

## 2024-04-08 ENCOUNTER — APPOINTMENT (OUTPATIENT)
Dept: RADIOLOGY | Facility: MEDICAL CENTER | Age: 44
End: 2024-04-08
Attending: PHYSICIAN ASSISTANT
Payer: COMMERCIAL

## 2024-04-08 DIAGNOSIS — Z12.31 VISIT FOR SCREENING MAMMOGRAM: ICD-10-CM

## 2024-04-08 PROCEDURE — 77067 SCR MAMMO BI INCL CAD: CPT

## 2024-04-11 ENCOUNTER — PATIENT MESSAGE (OUTPATIENT)
Dept: MEDICAL GROUP | Facility: LAB | Age: 44
End: 2024-04-11
Payer: COMMERCIAL

## 2024-04-11 DIAGNOSIS — R92.30 DENSE BREASTS: ICD-10-CM

## 2024-04-18 ENCOUNTER — APPOINTMENT (OUTPATIENT)
Dept: RADIOLOGY | Facility: MEDICAL CENTER | Age: 44
End: 2024-04-18
Attending: NURSE PRACTITIONER

## 2024-05-17 ENCOUNTER — HOSPITAL ENCOUNTER (OUTPATIENT)
Dept: RADIOLOGY | Facility: MEDICAL CENTER | Age: 44
End: 2024-05-17
Attending: PHYSICIAN ASSISTANT
Payer: COMMERCIAL

## 2024-05-17 DIAGNOSIS — R92.30 DENSE BREASTS: ICD-10-CM

## 2024-06-12 ENCOUNTER — RESEARCH ENCOUNTER (OUTPATIENT)
Dept: RESEARCH | Facility: MEDICAL CENTER | Age: 44
End: 2024-06-12
Payer: COMMERCIAL

## 2024-06-20 ENCOUNTER — RESEARCH ENCOUNTER (OUTPATIENT)
Dept: RESEARCH | Facility: MEDICAL CENTER | Age: 44
End: 2024-06-20

## 2024-09-17 PROBLEM — M77.11 RIGHT TENNIS ELBOW: Status: ACTIVE | Noted: 2024-09-17

## 2024-10-02 DIAGNOSIS — Z00.6 RESEARCH STUDY PATIENT: ICD-10-CM

## 2024-10-10 ENCOUNTER — HOSPITAL ENCOUNTER (OUTPATIENT)
Dept: LAB | Facility: MEDICAL CENTER | Age: 44
End: 2024-10-10
Attending: PHYSICIAN ASSISTANT
Payer: COMMERCIAL

## 2024-10-10 DIAGNOSIS — Z00.6 RESEARCH STUDY PATIENT: ICD-10-CM

## 2024-10-16 LAB
ELF SCORE: 8.01 -
HA (HYALURONIC ACID): 13.36 NG/ML
PIIINP (AMINO-TERMINAL PROPEPTIDE): 7.34 NG/ML
RELATIVE RISK: NORMAL
RISK GROUP: NORMAL
RISK: 3.3 %
TIMP-1 (TISSUE INHIBITOR OF MMP1): 173.5 NG/ML

## 2024-11-15 DIAGNOSIS — R22.1 NECK SWELLING: ICD-10-CM

## 2024-11-16 ENCOUNTER — APPOINTMENT (OUTPATIENT)
Dept: RADIOLOGY | Facility: MEDICAL CENTER | Age: 44
End: 2024-11-16
Attending: NURSE PRACTITIONER
Payer: COMMERCIAL

## 2024-11-16 DIAGNOSIS — R22.1 NECK SWELLING: ICD-10-CM

## 2024-11-16 PROCEDURE — 700117 HCHG RX CONTRAST REV CODE 255: Performed by: NURSE PRACTITIONER

## 2024-11-16 PROCEDURE — 70491 CT SOFT TISSUE NECK W/DYE: CPT

## 2024-11-16 RX ADMIN — IOHEXOL 80 ML: 350 INJECTION, SOLUTION INTRAVENOUS at 11:39

## 2024-12-11 ENCOUNTER — HOSPITAL ENCOUNTER (OUTPATIENT)
Dept: RADIOLOGY | Facility: MEDICAL CENTER | Age: 44
End: 2024-12-11
Attending: PHYSICIAN ASSISTANT
Payer: COMMERCIAL

## 2024-12-11 PROCEDURE — 76641 ULTRASOUND BREAST COMPLETE: CPT

## 2025-04-11 ENCOUNTER — APPOINTMENT (OUTPATIENT)
Dept: RADIOLOGY | Facility: MEDICAL CENTER | Age: 45
End: 2025-04-11
Attending: PHYSICIAN ASSISTANT
Payer: COMMERCIAL

## 2025-04-11 DIAGNOSIS — Z12.31 VISIT FOR SCREENING MAMMOGRAM: ICD-10-CM

## 2025-04-11 PROCEDURE — 77067 SCR MAMMO BI INCL CAD: CPT

## 2025-04-15 ENCOUNTER — RESULTS FOLLOW-UP (OUTPATIENT)
Dept: MEDICAL GROUP | Facility: LAB | Age: 45
End: 2025-04-15

## 2025-05-13 NOTE — Clinical Note
REFERRAL APPROVAL NOTICE         Sent on May 13, 2025                   Hailey Machuca  [address] [facility] [address]                   Dear Ms. Machuca,    After a careful review of the medical information and benefit coverage, [facility] has processed your referral. See below for additional details.    If applicable, you must be actively enrolled with your insurance for coverage of the authorized service. If you have any questions regarding your coverage, please contact your insurance directly.    REFERRAL INFORMATION   Referral #:  [id number]  Referred-To Department    Referred-By Provider:  Speech Therapy    Yudi Fuentes M.D.   [facility]      [address] [place] [address]  [phone] [address] [facility] [id number] [phone]    Referral Start Date:  05/13/2025  Referral End Date:   05/13/2026             SCHEDULING  If you do not already have an appointment, please call [phone] to make an appointment.     MORE INFORMATION  If you do not already have a PDV account, sign up at: [email or web address]  You can access your medical information, make appointments, see lab results, billing information, and more.  If you have questions regarding this referral, please contact  the [facility] Referrals department at:             [phone]. Monday - Friday 8:00AM - 5:00PM.     Sincerely,    [facility]

## 2025-05-19 DIAGNOSIS — Z13.1 DIABETES MELLITUS SCREENING: ICD-10-CM

## 2025-05-19 DIAGNOSIS — Z13.29 SCREENING FOR THYROID DISORDER: ICD-10-CM

## 2025-05-19 DIAGNOSIS — Z13.220 LIPID SCREENING: Primary | ICD-10-CM

## 2025-05-23 ENCOUNTER — HOSPITAL ENCOUNTER (OUTPATIENT)
Dept: LAB | Facility: MEDICAL CENTER | Age: 45
End: 2025-05-23
Attending: PHYSICIAN ASSISTANT
Payer: COMMERCIAL

## 2025-05-23 DIAGNOSIS — Z13.1 DIABETES MELLITUS SCREENING: ICD-10-CM

## 2025-05-23 DIAGNOSIS — Z13.220 LIPID SCREENING: ICD-10-CM

## 2025-05-23 DIAGNOSIS — Z13.29 SCREENING FOR THYROID DISORDER: ICD-10-CM

## 2025-05-23 LAB
ALBUMIN SERPL BCP-MCNC: 4 G/DL (ref 3.2–4.9)
ALBUMIN/GLOB SERPL: 1.4 G/DL
ALP SERPL-CCNC: 49 U/L (ref 30–99)
ALT SERPL-CCNC: 14 U/L (ref 2–50)
ANION GAP SERPL CALC-SCNC: 8 MMOL/L (ref 7–16)
AST SERPL-CCNC: 20 U/L (ref 12–45)
BASOPHILS # BLD AUTO: 0.6 % (ref 0–1.8)
BASOPHILS # BLD: 0.03 K/UL (ref 0–0.12)
BILIRUB SERPL-MCNC: 0.6 MG/DL (ref 0.1–1.5)
BUN SERPL-MCNC: 7 MG/DL (ref 8–22)
CALCIUM ALBUM COR SERPL-MCNC: 8.9 MG/DL (ref 8.5–10.5)
CALCIUM SERPL-MCNC: 8.9 MG/DL (ref 8.5–10.5)
CHLORIDE SERPL-SCNC: 106 MMOL/L (ref 96–112)
CHOLEST SERPL-MCNC: 208 MG/DL (ref 100–199)
CO2 SERPL-SCNC: 25 MMOL/L (ref 20–33)
CREAT SERPL-MCNC: 0.66 MG/DL (ref 0.5–1.4)
EOSINOPHIL # BLD AUTO: 0.08 K/UL (ref 0–0.51)
EOSINOPHIL NFR BLD: 1.7 % (ref 0–6.9)
ERYTHROCYTE [DISTWIDTH] IN BLOOD BY AUTOMATED COUNT: 39.7 FL (ref 35.9–50)
EST. AVERAGE GLUCOSE BLD GHB EST-MCNC: 97 MG/DL
GFR SERPLBLD CREATININE-BSD FMLA CKD-EPI: 111 ML/MIN/1.73 M 2
GLOBULIN SER CALC-MCNC: 2.8 G/DL (ref 1.9–3.5)
GLUCOSE SERPL-MCNC: 81 MG/DL (ref 65–99)
HBA1C MFR BLD: 5 % (ref 4–5.6)
HCT VFR BLD AUTO: 41.3 % (ref 37–47)
HDLC SERPL-MCNC: 59 MG/DL
HGB BLD-MCNC: 13.7 G/DL (ref 12–16)
IMM GRANULOCYTES # BLD AUTO: 0.01 K/UL (ref 0–0.11)
IMM GRANULOCYTES NFR BLD AUTO: 0.2 % (ref 0–0.9)
LDLC SERPL CALC-MCNC: 128 MG/DL
LYMPHOCYTES # BLD AUTO: 1.98 K/UL (ref 1–4.8)
LYMPHOCYTES NFR BLD: 42.6 % (ref 22–41)
MCH RBC QN AUTO: 30 PG (ref 27–33)
MCHC RBC AUTO-ENTMCNC: 33.2 G/DL (ref 32.2–35.5)
MCV RBC AUTO: 90.4 FL (ref 81.4–97.8)
MONOCYTES # BLD AUTO: 0.51 K/UL (ref 0–0.85)
MONOCYTES NFR BLD AUTO: 11 % (ref 0–13.4)
NEUTROPHILS # BLD AUTO: 2.04 K/UL (ref 1.82–7.42)
NEUTROPHILS NFR BLD: 43.9 % (ref 44–72)
NRBC # BLD AUTO: 0 K/UL
NRBC BLD-RTO: 0 /100 WBC (ref 0–0.2)
PLATELET # BLD AUTO: 250 K/UL (ref 164–446)
PMV BLD AUTO: 9.7 FL (ref 9–12.9)
POTASSIUM SERPL-SCNC: 3.9 MMOL/L (ref 3.6–5.5)
PROT SERPL-MCNC: 6.8 G/DL (ref 6–8.2)
RBC # BLD AUTO: 4.57 M/UL (ref 4.2–5.4)
SODIUM SERPL-SCNC: 139 MMOL/L (ref 135–145)
TRIGL SERPL-MCNC: 104 MG/DL (ref 0–149)
TSH SERPL DL<=0.005 MIU/L-ACNC: 1.88 UIU/ML (ref 0.38–5.33)
WBC # BLD AUTO: 4.7 K/UL (ref 4.8–10.8)

## 2025-05-23 PROCEDURE — 83036 HEMOGLOBIN GLYCOSYLATED A1C: CPT

## 2025-05-23 PROCEDURE — 80061 LIPID PANEL: CPT

## 2025-05-23 PROCEDURE — 85025 COMPLETE CBC W/AUTO DIFF WBC: CPT

## 2025-05-23 PROCEDURE — 80053 COMPREHEN METABOLIC PANEL: CPT

## 2025-05-23 PROCEDURE — 84443 ASSAY THYROID STIM HORMONE: CPT

## 2025-05-23 PROCEDURE — 36415 COLL VENOUS BLD VENIPUNCTURE: CPT

## 2025-05-27 ENCOUNTER — RESULTS FOLLOW-UP (OUTPATIENT)
Dept: MEDICAL GROUP | Facility: LAB | Age: 45
End: 2025-05-27

## 2025-05-28 ENCOUNTER — SPEECH THERAPY (OUTPATIENT)
Dept: SPEECH THERAPY | Facility: OTHER | Age: 45
End: 2025-05-28
Payer: COMMERCIAL

## 2025-05-28 DIAGNOSIS — R05.3 CHRONIC COUGH: Primary | ICD-10-CM

## 2025-05-28 NOTE — OP THERAPY EVALUATION
Outpatient Speech Therapy  INITIAL EVALUATION    [facility] Speech Pathology & Audiology  [address] [facility] [address]  Phone:  [phone]  Fax:  [phone]    Date of Evaluation: 05/28/2025    Patient: Hailey Machuca  YOB: 1980  MRN: [id number]     Referring Provider: Yudi Fuentes M.D.  [address] [place] [id number]   Referring Diagnosis No admission diagnoses are documented for this encounter.     Time Calculation    Start time: 1200  Stop time: 1235 Time Calculation (min): 35 minutes           Chief Complaint: Voice Evaluation (Chronic throat clearing/cough)    Visit Diagnoses     ICD-10-CM   1. Chronic cough  R05.3     Background: Ms. Machuca complains of chronic throat clearing/coughing. This began about 2-2 1/2 years ago. She has been to an allergist and tried desensitizing shots but without satisfaction.  She saw Dr. Fuentes about six months ago and the examination was essentially normal.  She takes Prilosec for GERD but does not feel like it helps very much. She estimates that she clears her throat about every three minutes, and by the end of the day, her throat/larynx is sore and tired.  She has no vocal complaints and no dyspnea complaints.    Assessment:  Vocal quality today was within normal limits.  The VHI was 0/120.  The Dyspnea Index was 0/40.  Acoustic measures were not obtained.  Because she has seen Dr. Fuentes recently and the exam was normal, endoscopy was not done.      Subjective Evaluation  Past Medical History[1]  Past Surgical History[2]  Speech Therapy Objective  Assessments  Speech Therapy Plan :   Prognosis & Recommendations  Impression Summary:  Ms. Machuca has chronic throat clearing/cough of about 2 1/2 years duration. The etiology is uncertain. It is not associated with dysphonia or VCD.    Prognosis:  Good  Goals  Short Term Goals:  STG1:  Use demulcents as directed to soothe throat.  STG2:  Practice rescue breathing as directed to  replace coughing.  Short Term Goal Duration (Weeks):  2-4 weeks  Long Term Goals:  LTG1: Return in 1-2 weeks if coughing not satisfactorily reduced.  Long Term Goal Duration (Weeks):  2-4 weeks  Potential barriers to Goal Achievement:  None  Therapy Recommendations  Recommendation:  Individual Speech Therapy,[id number]  Frequency:  1x week  Duration (in visits):  5      Functional Assessment Used       Referring provider co-signature:  I have reviewed this plan of care and my co-signature certifies the need for services.    Certification Period: 05/28/2025 to  08/26/25    Physician Signature: ________________________________ Date: ______________                 [1] No past medical history on file.  [2]   Past Surgical History:  Procedure Laterality Date    PB ELBOW ARTHROSCOP,PART DEBRIDE Right 10/4/2024    Procedure: RIGHT ELBOW ARTHROSCOPY, RIGHT LATERAL DEBRIDEMENT, REPAIRS AS INDICATED;  Surgeon: Osvaldo Mcfarland M.D.;  Location: [facility];  Service: Orthopedics    DENTAL EXTRACTION(S)      TONSILLECTOMY

## 2025-07-10 ENCOUNTER — PATIENT MESSAGE (OUTPATIENT)
Dept: MEDICAL GROUP | Facility: LAB | Age: 45
End: 2025-07-10
Payer: COMMERCIAL

## 2025-07-10 DIAGNOSIS — Z91.89 OTHER SPECIFIED PERSONAL RISK FACTORS, NOT ELSEWHERE CLASSIFIED: Primary | ICD-10-CM

## 2025-07-20 ENCOUNTER — HOSPITAL ENCOUNTER (OUTPATIENT)
Dept: RADIOLOGY | Facility: MEDICAL CENTER | Age: 45
End: 2025-07-20
Attending: PHYSICIAN ASSISTANT
Payer: COMMERCIAL

## 2025-07-20 DIAGNOSIS — Z91.89 OTHER SPECIFIED PERSONAL RISK FACTORS, NOT ELSEWHERE CLASSIFIED: ICD-10-CM

## 2025-07-20 PROCEDURE — 4410556 CT-CARDIAC SCORING (SELF PAY ONLY)

## 2025-07-21 ENCOUNTER — RESULTS FOLLOW-UP (OUTPATIENT)
Dept: MEDICAL GROUP | Facility: LAB | Age: 45
End: 2025-07-21
Payer: COMMERCIAL